# Patient Record
Sex: FEMALE | Race: WHITE | NOT HISPANIC OR LATINO | Employment: OTHER | URBAN - METROPOLITAN AREA
[De-identification: names, ages, dates, MRNs, and addresses within clinical notes are randomized per-mention and may not be internally consistent; named-entity substitution may affect disease eponyms.]

---

## 2021-02-01 DIAGNOSIS — Z23 ENCOUNTER FOR IMMUNIZATION: ICD-10-CM

## 2023-10-14 ENCOUNTER — APPOINTMENT (EMERGENCY)
Dept: RADIOLOGY | Facility: HOSPITAL | Age: 88
End: 2023-10-14
Payer: MEDICARE

## 2023-10-14 ENCOUNTER — HOSPITAL ENCOUNTER (EMERGENCY)
Facility: HOSPITAL | Age: 88
Discharge: HOME | End: 2023-10-14
Attending: EMERGENCY MEDICINE | Admitting: EMERGENCY MEDICINE
Payer: MEDICARE

## 2023-10-14 VITALS
HEART RATE: 101 BPM | SYSTOLIC BLOOD PRESSURE: 148 MMHG | HEIGHT: 59 IN | WEIGHT: 130 LBS | TEMPERATURE: 97 F | DIASTOLIC BLOOD PRESSURE: 89 MMHG | OXYGEN SATURATION: 95 % | BODY MASS INDEX: 26.21 KG/M2 | RESPIRATION RATE: 20 BRPM

## 2023-10-14 DIAGNOSIS — S22.20XA CLOSED FRACTURE OF STERNUM, UNSPECIFIED PORTION OF STERNUM, INITIAL ENCOUNTER: Primary | ICD-10-CM

## 2023-10-14 PROCEDURE — 63700000 HC SELF-ADMINISTRABLE DRUG: Performed by: PHYSICIAN ASSISTANT

## 2023-10-14 PROCEDURE — 99283 EMERGENCY DEPT VISIT LOW MDM: CPT | Mod: 25

## 2023-10-14 PROCEDURE — 71120 X-RAY EXAM BREASTBONE 2/>VWS: CPT

## 2023-10-14 PROCEDURE — 71046 X-RAY EXAM CHEST 2 VIEWS: CPT

## 2023-10-14 PROCEDURE — 93005 ELECTROCARDIOGRAM TRACING: CPT | Performed by: EMERGENCY MEDICINE

## 2023-10-14 RX ORDER — ACETAMINOPHEN 325 MG/1
650 TABLET ORAL ONCE
Status: COMPLETED | OUTPATIENT
Start: 2023-10-14 | End: 2023-10-14

## 2023-10-14 RX ADMIN — ACETAMINOPHEN 650 MG: 325 TABLET ORAL at 18:33

## 2023-10-14 ASSESSMENT — ENCOUNTER SYMPTOMS
PALPITATIONS: 0
DIZZINESS: 0
COUGH: 0
LIGHT-HEADEDNESS: 0
VOMITING: 0
WHEEZING: 0
CHILLS: 0
NAUSEA: 0
SHORTNESS OF BREATH: 0
BACK PAIN: 0
DIAPHORESIS: 0
FEVER: 0

## 2023-10-14 NOTE — ED ATTESTATION NOTE
I have personally seen and examined the patient.  I reviewed and agree with physician assistant / nurse practitioners assessment and plan of care, with the following exceptions: None    I personally performed the key components of the encounter and provided a substantive portion of the care and medical decision making    My examination, assessment, and plan of care of Nicole Goncalves is as follows:     PT was bending over 2 hours ago and felt a sudden onset of pain to the middle of her sternum. Hurts to bend over, twist, deep breaths.  No sob  Exam: awake, alert. NAD. Heart reg, lungs clear, abd soft, nt.  Chest wall: mod reproducible tenderness over the lower sternum.       Dane Payne MD  10/14/23 2450

## 2023-10-14 NOTE — DISCHARGE INSTRUCTIONS
Take Tylenol every 4-6 hours as needed for pain.    Please follow-up with a primary care provider (PCP) regularly. Please bring all your discharge paperwork with you to your follow up appointment. Your primary care physician will go over all of your results again including any incidental findings. If you do not have a PCP, please call [phone] ([phone]) for help finding one.    Return to ER for any new or worsening symptoms.

## 2023-10-14 NOTE — ED PROVIDER NOTES
Emergency Medicine Note  HPI   HISTORY OF PRESENT ILLNESS     HPI   89 y/o female with presents to the ED with complaint of sternum pain after bending down forward to  something 2 hours ago. She heard a cracking noise and believes it is MSK related.  Pain is significantly worse with movement when twisting or bending and significantly reproducible on palpation.  Denies any diaphoresis, nausea vomiting, exertional chest pain or radiation of the pain.  Denies any shortness of breath.  She has not taken anything for the pain at this time.  Patient was concerned due to prior history of rib fracture that she came to the ER for further evaluation.      Patient History   PAST HISTORY     Reviewed from Nursing Triage:       No past medical history on file.    No past surgical history on file.    No family history on file.           Review of Systems   REVIEW OF SYSTEMS     Review of Systems   Constitutional: Negative for chills, diaphoresis and fever.   Respiratory: Negative for cough, shortness of breath and wheezing.    Cardiovascular: Negative for palpitations and leg swelling.   Gastrointestinal: Negative for nausea and vomiting.   Musculoskeletal: Negative for back pain.        Pain overlying sternum   Neurological: Negative for dizziness, syncope and light-headedness.         VITALS     ED Vitals    Date/Time Temp Pulse Resp BP SpO2 [facility]   10/14/23 1756 36.1 °C (97 °F) 101 20 148/89 95 % EMU        Pulse Ox %: 95 % (10/14/23 1815)  Pulse Ox Interpretation: Normal (10/14/23 1815)           Physical Exam   PHYSICAL EXAM     Physical Exam  Vitals and nursing note reviewed.   Constitutional:       General: She is not in acute distress.     Appearance: She is not toxic-appearing or diaphoretic.   HENT:      Head: Normocephalic and atraumatic.   Cardiovascular:      Rate and Rhythm: Normal rate and regular rhythm.      Heart sounds: Normal heart sounds.   Pulmonary:      Effort: Pulmonary effort is normal. No  respiratory distress.      Breath sounds: Normal breath sounds.   Abdominal:      General: Abdomen is flat. There is no distension.      Palpations: Abdomen is soft.      Tenderness: There is no abdominal tenderness.   Musculoskeletal:         General: Normal range of motion.      Comments: Significant reproducible TTP overlying top of sternum   Skin:     General: Skin is warm and dry.   Neurological:      General: No focal deficit present.      Mental Status: She is alert.      Cranial Nerves: No cranial nerve deficit.           PROCEDURES     Procedures     DATA     Results     None          Imaging Results          X-RAY CHEST 2 VIEWS (In process)                X-RAY STERNUM PA AND LATERAL (Preliminary result)  Result time 10/14/23 19:14:24    Preliminary Interpretation    Sternal fracture                              ECG 12 lead    (Results Pending)       Scoring tools                                  ED Course & MDM   MDM / ED COURSE / CLINICAL IMPRESSION / DISPO     MDM    ED Course as of 10/14/23 1927   Sat Oct 14, 2023   1809 Pt presents with significant reproducible midsternal CP that is worse with movement  Pain started immediately after bending forward to pick something up  No diaphoresis, N/V, exertional CP or radiation  Plan for CXR with sternum views, EKG, Tylenol [TM]   1916 On x-ray questionable fracture of the sternum compared to prior imaging.  Will discharge home with follow-up PCP.  Educated on analgesics.  Patient is agreeable to above plan. [TM]      ED Course User Index  [TM] Gagandeep Aguero PA C     Clinical Impression      Closed fracture of sternum, unspecified portion of sternum, initial encounter     _________________     ED Disposition   Discharge                   Gagandeep Aguero PA C  10/14/23 1909       Gagandeep Aguero PA C  10/14/23 1918       Gagandeep Aguero PA C  10/14/23 1927

## 2023-10-15 LAB
ATRIAL RATE: 92
P AXIS: 27
PR INTERVAL: 144
QRS DURATION: 66
QT INTERVAL: 360
QTC CALCULATION(BAZETT): 445
R AXIS: -12
T WAVE AXIS: 11
VENTRICULAR RATE: 92

## 2023-10-15 PROCEDURE — 93010 ELECTROCARDIOGRAM REPORT: CPT | Performed by: INTERNAL MEDICINE

## 2023-10-16 ENCOUNTER — TRANSCRIBE ORDERS (OUTPATIENT)
Dept: RADIOLOGY | Age: 88
End: 2023-10-16

## 2023-10-16 ENCOUNTER — TRANSCRIBE ORDERS (OUTPATIENT)
Dept: SCHEDULING | Age: 88
End: 2023-10-16

## 2023-10-16 DIAGNOSIS — R93.89 ABNORMAL FINDINGS ON DIAGNOSTIC IMAGING OF OTHER SPECIFIED BODY STRUCTURES: Primary | ICD-10-CM

## 2023-10-19 ENCOUNTER — HOSPITAL ENCOUNTER (OUTPATIENT)
Dept: RADIOLOGY | Facility: HOSPITAL | Age: 88
Discharge: HOME | End: 2023-10-19
Attending: INTERNAL MEDICINE
Payer: MEDICARE

## 2023-10-19 DIAGNOSIS — R93.89 ABNORMAL FINDINGS ON DIAGNOSTIC IMAGING OF OTHER SPECIFIED BODY STRUCTURES: ICD-10-CM

## 2023-10-19 PROCEDURE — 71250 CT THORAX DX C-: CPT

## 2023-10-24 ENCOUNTER — HOSPITAL ENCOUNTER (OUTPATIENT)
Dept: RADIOLOGY | Facility: CLINIC | Age: 88
Discharge: HOME | End: 2023-10-24
Attending: INTERNAL MEDICINE
Payer: MEDICARE

## 2023-10-24 VITALS — WEIGHT: 130 LBS | BODY MASS INDEX: 27.29 KG/M2 | HEIGHT: 58 IN

## 2023-10-24 DIAGNOSIS — R91.8 OTHER NONSPECIFIC ABNORMAL FINDING OF LUNG FIELD: ICD-10-CM

## 2023-10-24 RX ORDER — FLUDEOXYGLUCOSE F18 300 MCI/ML
8.98 INJECTION INTRAVENOUS ONCE
Status: COMPLETED | OUTPATIENT
Start: 2023-10-24 | End: 2023-10-24

## 2023-10-24 RX ADMIN — FLUDEOXYGLUCOSE F18 8.98 MILLICURIE: 300 INJECTION INTRAVENOUS at 10:50

## 2023-10-25 ENCOUNTER — TRANSCRIBE ORDERS (OUTPATIENT)
Dept: REGISTRATION | Age: 88
End: 2023-10-25

## 2023-10-25 DIAGNOSIS — K62.9 DISEASE OF ANUS AND RECTUM, UNSPECIFIED: Primary | ICD-10-CM

## 2023-10-27 ENCOUNTER — PATIENT OUTREACH (OUTPATIENT)
Dept: RADIATION ONCOLOGY | Facility: HOSPITAL | Age: 88
End: 2023-10-27
Payer: MEDICARE

## 2023-10-30 ENCOUNTER — PATIENT OUTREACH (OUTPATIENT)
Dept: RADIATION ONCOLOGY | Facility: HOSPITAL | Age: 88
End: 2023-10-30
Payer: MEDICARE

## 2023-11-02 ENCOUNTER — HOSPITAL ENCOUNTER (OUTPATIENT)
Dept: RADIOLOGY | Facility: HOSPITAL | Age: 88
Discharge: HOME | End: 2023-11-02
Attending: INTERNAL MEDICINE
Payer: MEDICARE

## 2023-11-02 DIAGNOSIS — K62.9 DISEASE OF ANUS AND RECTUM, UNSPECIFIED: ICD-10-CM

## 2023-11-02 PROCEDURE — A9698 NON-RAD CONTRAST MATERIALNOC: HCPCS

## 2023-11-02 PROCEDURE — 63600105 HC IODINE BASED CONTRAST: Mod: JW

## 2023-11-02 PROCEDURE — 74177 CT ABD & PELVIS W/CONTRAST: CPT

## 2023-11-02 PROCEDURE — 25500000 HC DRUGS/INCIDENT RAD

## 2023-11-02 RX ORDER — IOPAMIDOL 755 MG/ML
75 INJECTION, SOLUTION INTRAVASCULAR
Status: COMPLETED | OUTPATIENT
Start: 2023-11-02 | End: 2023-11-02

## 2023-11-02 RX ADMIN — IOPAMIDOL 75 ML: 755 INJECTION, SOLUTION INTRAVENOUS at 12:45

## 2023-11-02 RX ADMIN — IOHEXOL 500 ML: 9 SOLUTION ORAL at 12:24

## 2023-11-03 ENCOUNTER — OFFICE VISIT (OUTPATIENT)
Dept: COLORECTAL SURGERY | Facility: CLINIC | Age: 88
End: 2023-11-03
Payer: MEDICARE

## 2023-11-03 VITALS — WEIGHT: 130 LBS | HEIGHT: 58 IN | BODY MASS INDEX: 27.29 KG/M2

## 2023-11-03 DIAGNOSIS — R93.3 ABNORMAL FINDING ON GI TRACT IMAGING: Primary | ICD-10-CM

## 2023-11-03 PROCEDURE — 99204 OFFICE O/P NEW MOD 45 MIN: CPT | Mod: 25 | Performed by: COLON & RECTAL SURGERY

## 2023-11-03 RX ORDER — ALPRAZOLAM 0.25 MG/1
0.25 TABLET ORAL DAILY PRN
COMMUNITY
Start: 2023-10-09

## 2023-11-03 RX ORDER — PHENAZOPYRIDINE HYDROCHLORIDE 200 MG/1
TABLET, FILM COATED ORAL
COMMUNITY

## 2023-11-03 RX ORDER — BENZONATATE 100 MG/1
100 CAPSULE ORAL 3 TIMES DAILY
COMMUNITY
Start: 2023-10-02

## 2023-11-03 RX ORDER — POTASSIUM CHLORIDE 750 MG/1
1 CAPSULE, EXTENDED RELEASE ORAL DAILY
COMMUNITY

## 2023-11-03 RX ORDER — ESTRADIOL 0.1 MG/G
CREAM VAGINAL
COMMUNITY

## 2023-11-03 RX ORDER — TIOTROPIUM BROMIDE INHALATION SPRAY 3.12 UG/1
1 SPRAY, METERED RESPIRATORY (INHALATION) DAILY PRN
COMMUNITY
Start: 2023-11-02

## 2023-11-03 RX ORDER — FUROSEMIDE 20 MG/1
TABLET ORAL
COMMUNITY
Start: 2023-10-09

## 2023-11-03 RX ORDER — OFLOXACIN 3 MG/ML
SOLUTION AURICULAR (OTIC)
COMMUNITY

## 2023-11-03 NOTE — PROGRESS NOTES
HISTORY & PHYSICAL EXAM      CC: Abnormal imaging of CT abdomen pelvis and PET/CT    HPI: Nicole is a very pleasant 88 year old referred by her PCP, Dr. Sullivan. 3 weeks ago  was bending over and twisting and ended up cracking her sternum, she went to the ED where she had a chest X-ray, showing a mass in the lung. She later had a follow-up PET, showing thickening at the rectum.     She has had multiple surgeries to her abdomen. She had an oophorectomy and then had perforated bowel and a colonic resection. She later had numerous SBO, and reports she may have had a resection surgery of the small intestine. She had a cholecystectomy in her 20's. She has a very large ventral hernia.     She moves her bowels 2-3 times per day. She denies rectal pain or rectal bleeding. She denies N/V, bloating, abdominal ain or recent unexpected weight loss.     Her last colonoscopy was man years ago she cannot remember the exact date. She is very scared to experience a perforation with her hernia.     She denies a family history of IBD or colorectal cancer.      Past medical history: COPD, large ventral incisional hernia with loss of abdominal domain    Past surgical history: Cholecystectomy, colonic resection, small bowel resection, history of small bowel obstructions status post expiratory laparotomy, oophorectomy    Current Outpatient Medications:     ALPRAZolam (XANAX) 0.25 mg tablet, TAKE ONE TABLET BY MOUTH DAILY AS NEEDED FOR ANXIETY, Disp: , Rfl:     benzonatate (TESSALON) 100 mg capsule, Take 100 mg by mouth 3 (three) times a day., Disp: , Rfl:     estradioL (ESTRACE) 0.01 % (0.1 mg/gram) vaginal cream, INSERT 1 GRAM NIGHTLY X 2 MONTHS, THEN EVERY OTHER NIGHT FOR 2 MONTHS, THEN TWICE WEEKLY, Disp: , Rfl:     furosemide (LASIX) 20 mg tablet, TAKE ONE TABLET BY MOUTH EVERY DAY AS NEEDED FOR ANKLE SWELLING, Disp: , Rfl:     ofloxacin (FLOXIN) 0.3 % otic solution, , Disp: , Rfl:     phenazopyridine (PYRIDIUM) 200 mg  tablet, TAKE ONE TABLET BY MOUTH UP TO THREE TIMES A DAY AS NEEDED, Disp: , Rfl:     potassium chloride (MICRO-K) 10 mEq CR capsule, Take 1 capsule by mouth daily., Disp: , Rfl:     tiotropium bromide (SPIRIVA RESPIMAT) 2.5 mcg/actuation mist inhaler, Spiriva Respimat 2.5 MCG/ACT Inhalation Aerosol Solution QTY: 4 gram Days: 30 Refills: 5  Written: 11/02/23 Patient Instructions: 2 puffs q d, Disp: , Rfl:     potassium-calcium-magnes-manga [phone] mg powder in packet, Potassium, Disp: , Rfl:    Allergies   Allergen Reactions    Naproxen Sodium Other (see comments) and GI intolerance    Dextromethorphan Hbr     Dextromethorphan Palpitations     Social History     Socioeconomic History    Marital status:      Spouse name: Not on file    Number of children: Not on file    Years of education: Not on file    Highest education level: Not on file   Occupational History    Not on file   Tobacco Use    Smoking status: Not on file    Smokeless tobacco: Not on file   Substance and Sexual Activity    Alcohol use: Not on file    Drug use: Not on file    Sexual activity: Not on file   Other Topics Concern    Not on file   Social History Narrative    Not on file     Social Determinants of Health     Financial Resource Strain: Not on file   Food Insecurity: No Food Insecurity (10/14/2023)    Hunger Vital Sign     Worried About Running Out of Food in the Last Year: Never true     Ran Out of Food in the Last Year: Never true   Transportation Needs: Not on file   Physical Activity: Not on file   Stress: Not on file   Social Connections: Not on file   Intimate Partner Violence: Not on file   Housing Stability: Not on file      No family history on file.    REVIEW OF SYSTEMS: A complete review of systems was performed.  Pertinent positives include: Constitutional as tired; HEENT-sinus problems, capped teeth, bridges; respiratory shortness of breath; GI-diarrhea; genitourinary menopause;  hematologic/lymphatic- swelling of arms or legs; musculoskeletal denies trauma, broken bones.  All other systems are reviewed and are negative except as noted in HPI.    PHYSICAL EXAM:  GEN: On examination the patient is resting comfortably.  NEURO/PSYCH: Alert and oriented ×3  HEENT: Eyes: Sclera anicteric  CHEST: Equal rise and fall the chest.  No tenderness bilaterally.  SKIN: Warm and moist  NODES: No groin or cervical lymphadenopathy  EXT: No palpable edema of the bilateral lower extremities.  No clubbing.  GI: Abdomen soft, nontender, nondistended.  No palpable liver or spleen edge.  The patient is a very large ventral incisional hernia with loss of abdominal domain that is nontender and soft.  There is no palpable mass, or t rebound enderness.  RECTAL: Perianal skin demonstrates no evidence of abnormality.  Digital exam reveals normal sphincter tone with no masses palpable.  Diagnostic flexible sigmoidoscopy: Carried out to level of the  descending colon was limited by scope length demonstrating no evidence of lesion seen in the colon except for 1 small subcentimeter polyp in the sigmoid colon.  There is certainly no abnormality in the rectum to explain the PET avid area on scan.    XRAYS: I personally reviewed the current X rays and the pertinent findings are: CT abdomen pelvis from 11/2/2023 demonstrates multiple pulmonary nodules consistent with metastatic disease, right hilar hypodensities, hypodensities in the liver and spleen, large ventral hernia.  PET/CT 10/24/2023: Demonstrates numerous  hypermetabolic mediastinal hilar lymph nodes as well as a small pulmonary nodule which would be consistent with carcinoma.  There was eccentric wall thickening of the rectum toward the left noted concerning for carcinoma.        ASSESSMENT/PLAN:     Abnormal finding on GI tract imaging  Patient had a PET/CT and CT scan that demonstrated potential thickening of the rectal wall concerning for rectal neoplasm primary  where she has lesions in her lung that are PET avid.  There is no evidence of lesion in her rectum on flexible sigmoidoscopy done today.  She did have a very small subcentimeter polyp in the sigmoid colon that I advised that she consider having removed endoscopically in the future however at this point I would recommend that she continue on with investigation to identify the issue causing a PET avid lesion in her lung.  She is going to see Dr. Crump for consultation in this regard.  I will see her back in the office on an as-needed basis and we will be happy to have her set up for colonoscopy if she wishes.

## 2023-11-03 NOTE — LETTER
November 3, 2023     Natalya Sullivan MD  [address]  [place] [address]  [facility] [address]    Patient: Nicole Goncalves  YOB: 1934  Date of Visit: 11/3/2023      Dear Dr. Sullivan:    Thank you for referring Nicole Goncalves to me for evaluation. Below are my notes for this consultation.    If you have questions, please do not hesitate to call me. I look forward to following your patient along with you.         Sincerely,        Bala Crouch MD        CC: Dalton Crump MD PhD    Willa, Bala MANDUJANO MD  11/3/2023 12:52 PM  Signed  HISTORY & PHYSICAL EXAM      CC: Abnormal imaging of CT abdomen pelvis and PET/CT    HPI: Nicole is a very pleasant 88 year old referred by her PCP, Dr. Sullivan. 3 weeks ago  was bending over and twisting and ended up cracking her sternum, she went to the ED where she had a chest X-ray, showing a mass in the lung. She later had a follow-up PET, showing thickening at the rectum.     She has had multiple surgeries to her abdomen. She had an oophorectomy and then had perforated bowel and a colonic resection. She later had numerous SBO, and reports she may have had a resection surgery of the small intestine. She had a cholecystectomy in her 20's. She has a very large ventral hernia.     She moves her bowels 2-3 times per day. She denies rectal pain or rectal bleeding. She denies N/V, bloating, abdominal ain or recent unexpected weight loss.     Her last colonoscopy was man years ago she cannot remember the exact date. She is very scared to experience a perforation with her hernia.     She denies a family history of IBD or colorectal cancer.      Past medical history: COPD, large ventral incisional hernia with loss of abdominal domain    Past surgical history: Cholecystectomy, colonic resection, small bowel resection, history of small bowel obstructions status post expiratory laparotomy, oophorectomy    Current Outpatient Medications:     ALPRAZolam (XANAX) 0.25 mg tablet,  TAKE ONE TABLET BY MOUTH DAILY AS NEEDED FOR ANXIETY, Disp: , Rfl:     benzonatate (TESSALON) 100 mg capsule, Take 100 mg by mouth 3 (three) times a day., Disp: , Rfl:     estradioL (ESTRACE) 0.01 % (0.1 mg/gram) vaginal cream, INSERT 1 GRAM NIGHTLY X 2 MONTHS, THEN EVERY OTHER NIGHT FOR 2 MONTHS, THEN TWICE WEEKLY, Disp: , Rfl:     furosemide (LASIX) 20 mg tablet, TAKE ONE TABLET BY MOUTH EVERY DAY AS NEEDED FOR ANKLE SWELLING, Disp: , Rfl:     ofloxacin (FLOXIN) 0.3 % otic solution, , Disp: , Rfl:     phenazopyridine (PYRIDIUM) 200 mg tablet, TAKE ONE TABLET BY MOUTH UP TO THREE TIMES A DAY AS NEEDED, Disp: , Rfl:     potassium chloride (MICRO-K) 10 mEq CR capsule, Take 1 capsule by mouth daily., Disp: , Rfl:     tiotropium bromide (SPIRIVA RESPIMAT) 2.5 mcg/actuation mist inhaler, Spiriva Respimat 2.5 MCG/ACT Inhalation Aerosol Solution QTY: 4 gram Days: 30 Refills: 5  Written: 11/02/23 Patient Instructions: 2 puffs q d, Disp: , Rfl:     potassium-calcium-magnes-manga [phone] mg powder in packet, Potassium, Disp: , Rfl:    Allergies   Allergen Reactions    Naproxen Sodium Other (see comments) and GI intolerance    Dextromethorphan Hbr     Dextromethorphan Palpitations     Social History     Socioeconomic History    Marital status:      Spouse name: Not on file    Number of children: Not on file    Years of education: Not on file    Highest education level: Not on file   Occupational History    Not on file   Tobacco Use    Smoking status: Not on file    Smokeless tobacco: Not on file   Substance and Sexual Activity    Alcohol use: Not on file    Drug use: Not on file    Sexual activity: Not on file   Other Topics Concern    Not on file   Social History Narrative    Not on file     Social Determinants of Health     Financial Resource Strain: Not on file   Food Insecurity: No Food Insecurity (10/14/2023)    Hunger Vital Sign     Worried About Running Out of Food in the Last Year:  Never true     Ran Out of Food in the Last Year: Never true   Transportation Needs: Not on file   Physical Activity: Not on file   Stress: Not on file   Social Connections: Not on file   Intimate Partner Violence: Not on file   Housing Stability: Not on file      No family history on file.    REVIEW OF SYSTEMS: A complete review of systems was performed.  Pertinent positives include: Constitutional as tired; HEENT-sinus problems, capped teeth, bridges; respiratory shortness of breath; GI-diarrhea; genitourinary menopause; hematologic/lymphatic- swelling of arms or legs; musculoskeletal denies trauma, broken bones.  All other systems are reviewed and are negative except as noted in HPI.    PHYSICAL EXAM:  GEN: On examination the patient is resting comfortably.  NEURO/PSYCH: Alert and oriented ×3  HEENT: Eyes: Sclera anicteric  CHEST: Equal rise and fall the chest.  No tenderness bilaterally.  SKIN: Warm and moist  NODES: No groin or cervical lymphadenopathy  EXT: No palpable edema of the bilateral lower extremities.  No clubbing.  GI: Abdomen soft, nontender, nondistended.  No palpable liver or spleen edge.  The patient is a very large ventral incisional hernia with loss of abdominal domain that is nontender and soft.  There is no palpable mass, or t rebound enderness.  RECTAL: Perianal skin demonstrates no evidence of abnormality.  Digital exam reveals normal sphincter tone with no masses palpable.  Diagnostic flexible sigmoidoscopy: Carried out to level of the  descending colon was limited by scope length demonstrating no evidence of lesion seen in the colon except for 1 small subcentimeter polyp in the sigmoid colon.  There is certainly no abnormality in the rectum to explain the PET avid area on scan.    XRAYS: I personally reviewed the current X rays and the pertinent findings are: CT abdomen pelvis from 11/2/2023 demonstrates multiple pulmonary nodules consistent with metastatic disease, right hilar  hypodensities, hypodensities in the liver and spleen, large ventral hernia.  PET/CT 10/24/2023: Demonstrates numerous  hypermetabolic mediastinal hilar lymph nodes as well as a small pulmonary nodule which would be consistent with carcinoma.  There was eccentric wall thickening of the rectum toward the left noted concerning for carcinoma.        ASSESSMENT/PLAN:     Abnormal finding on GI tract imaging  Patient had a PET/CT and CT scan that demonstrated potential thickening of the rectal wall concerning for rectal neoplasm primary where she has lesions in her lung that are PET avid.  There is no evidence of lesion in her rectum on flexible sigmoidoscopy done today.  She did have a very small subcentimeter polyp in the sigmoid colon that I advised that she consider having removed endoscopically in the future however at this point I would recommend that she continue on with investigation to identify the issue causing a PET avid lesion in her lung.  She is going to see Dr. Crump for consultation in this regard.  I will see her back in the office on an as-needed basis and we will be happy to have her set up for colonoscopy if she wishes.        Jovon Pérez PA C  11/3/2023 12:52 PM  Signed  Procedures

## 2023-11-03 NOTE — ASSESSMENT & PLAN NOTE
Patient had a PET/CT and CT scan that demonstrated potential thickening of the rectal wall concerning for rectal neoplasm primary where she has lesions in her lung that are PET avid.  There is no evidence of lesion in her rectum on flexible sigmoidoscopy done today.  She did have a very small subcentimeter polyp in the sigmoid colon that I advised that she consider having removed endoscopically in the future however at this point I would recommend that she continue on with investigation to identify the issue causing a PET avid lesion in her lung.  She is going to see Dr. Crump for consultation in this regard.  I will see her back in the office on an as-needed basis and we will be happy to have her set up for colonoscopy if she wishes.

## 2023-11-07 ENCOUNTER — PATIENT OUTREACH (OUTPATIENT)
Dept: RADIATION ONCOLOGY | Facility: HOSPITAL | Age: 88
End: 2023-11-07
Payer: MEDICARE

## 2023-11-07 ENCOUNTER — OFFICE VISIT (OUTPATIENT)
Dept: THORACIC SURGERY | Facility: CLINIC | Age: 88
End: 2023-11-07
Payer: MEDICARE

## 2023-11-07 ENCOUNTER — APPOINTMENT (OUTPATIENT)
Dept: LAB | Facility: HOSPITAL | Age: 88
End: 2023-11-07
Attending: PHYSICIAN ASSISTANT
Payer: MEDICARE

## 2023-11-07 VITALS
DIASTOLIC BLOOD PRESSURE: 80 MMHG | HEIGHT: 58 IN | WEIGHT: 133 LBS | BODY MASS INDEX: 27.92 KG/M2 | OXYGEN SATURATION: 98 % | HEART RATE: 93 BPM | SYSTOLIC BLOOD PRESSURE: 130 MMHG | RESPIRATION RATE: 20 BRPM | TEMPERATURE: 98.3 F

## 2023-11-07 DIAGNOSIS — Z01.818 PREOPERATIVE TESTING: ICD-10-CM

## 2023-11-07 DIAGNOSIS — R91.1 SOLITARY PULMONARY NODULE: Primary | ICD-10-CM

## 2023-11-07 PROBLEM — R59.0 MEDIASTINAL ADENOPATHY: Status: ACTIVE | Noted: 2023-11-06

## 2023-11-07 PROBLEM — R91.8 MULTIPLE LUNG NODULES ON CT: Status: ACTIVE | Noted: 2023-11-06

## 2023-11-07 LAB
ANION GAP SERPL CALC-SCNC: 5 MEQ/L (ref 3–15)
BUN SERPL-MCNC: 23 MG/DL (ref 7–25)
CALCIUM SERPL-MCNC: 9.6 MG/DL (ref 8.6–10.3)
CHLORIDE SERPL-SCNC: 110 MEQ/L (ref 98–107)
CO2 SERPL-SCNC: 26 MEQ/L (ref 21–31)
CREAT SERPL-MCNC: 0.6 MG/DL (ref 0.6–1.2)
ERYTHROCYTE [DISTWIDTH] IN BLOOD BY AUTOMATED COUNT: 13.5 % (ref 11.7–14.4)
GFR SERPL CREATININE-BSD FRML MDRD: >60 ML/MIN/1.73M*2
GLUCOSE SERPL-MCNC: 110 MG/DL (ref 70–99)
HCT VFR BLDCO AUTO: 41.9 % (ref 35–45)
HGB BLD-MCNC: 13.4 G/DL (ref 11.8–15.7)
MCH RBC QN AUTO: 32.7 PG (ref 28–33.2)
MCHC RBC AUTO-ENTMCNC: 32 G/DL (ref 32.2–35.5)
MCV RBC AUTO: 102.2 FL (ref 83–98)
PDW BLD AUTO: 12.6 FL (ref 9.4–12.3)
PLATELET # BLD AUTO: 194 K/UL (ref 150–369)
POTASSIUM SERPL-SCNC: 4 MEQ/L (ref 3.5–5.1)
RBC # BLD AUTO: 4.1 M/UL (ref 3.93–5.22)
SODIUM SERPL-SCNC: 141 MEQ/L (ref 136–145)
WBC # BLD AUTO: 7.09 K/UL (ref 3.8–10.5)

## 2023-11-07 PROCEDURE — 99204 OFFICE O/P NEW MOD 45 MIN: CPT | Performed by: THORACIC SURGERY (CARDIOTHORACIC VASCULAR SURGERY)

## 2023-11-07 PROCEDURE — 36415 COLL VENOUS BLD VENIPUNCTURE: CPT

## 2023-11-07 PROCEDURE — 85027 COMPLETE CBC AUTOMATED: CPT

## 2023-11-07 PROCEDURE — 82310 ASSAY OF CALCIUM: CPT

## 2023-11-07 NOTE — LETTER
Re: Nicole Goncalves  12/30/1934        Dear Natalya Sullivan MD    I appreciate the opportunity to evaluate  your patient Nicole Goncalves in the office today.    Nicole Goncalves is a very pleasant 89 y/o former smoker (60 pk year - 2 ppd x 30 years quit 35 years ago) who presents today to discuss bilateral lung nodules and mediastinal adenopathy.  She presented to the ED 10/14 with anterior chest pain after bending and feeling a crack.  A sternal fracture was likely and  there was haziness on xray that led to follow up CT imaging.  Bilateral lung nodularity, mediastinal adenopathy and emphysematous changes where noted. Follow up PET imaging showed the above findings to be significantly PET avid along with a focus in the rectum. She has a large ventral hernia with multiple abdominal continents so she is very weary of colonoscopy. On exam and with sigmoidoscopy, there was no concern for a mass; a small sigmoid polyp was removed.      Her review of systems is notable for anxiety as her primary complaint. She  does not have SOB at rest. JAY is noted if she has to walk incline but she does not do that often. She had not had  cough, hemoptysis, weight loss prior to her presentation to the ER. She now has decreased appetite which she attributes to anxiety.  She still has no cough or hemoptysis.  She has pressure in her hernia but no pain. She attributes increased ankle edema to her diet.  The remainder of her review of systems is negative        She has bilateral pulmonary nodularity and mediastinal adenopathy that are PET avid and accessible via navigational bronchoscopy and EBUS. This has been discussed with her, her son, PCP and oncology teams. We will plan to proceed with navigational bronchoscopy and EBUS to obtain tissue diagnosis. Detailed review of the procedure and risks were discussed. We addressed their great questions and she is  in favor of proceeding      Thank you for the opportunity to participate in her  care.    Dalton Crump MD PhD

## 2023-11-07 NOTE — PROGRESS NOTES
Thoracic Surgery Consultation      Patient ID: Nicole Goncalves                              : 1934  MRN: [id number]    Clinic:  [facility]                                            Visit Date: 2023  Encounter Provider: Dalton Crump  Referring Provider: Natalya Sullivan MD    Subjective       Nicole Goncalves is a very pleasant 89 y/o former smoker (60 pk year - 2 ppd x 30 years quit 35 years ago) who presents today to discuss bilateral lung nodules and mediastinal adenopathy.  She presented to the ED 10/14 with anterior chest pain after bending and feeling a crack.  A sternal fracture was likely and  there was haziness on xray that led to follow up CT imaging.  Bilateral lung nodularity, mediastinal adenopathy and emphysematous changes where noted. Follow up PET imaging showed the above findings to be significantly PET avid along with a focus in the rectum. She has a large ventral hernia with multiple abdominal continents so she is very weary of colonoscopy. On exam and with sigmoidoscopy, there was no concern for a mass; a small sigmoid polyp was removed.      Her review of systems is notable for anxiety as her primary complaint. She  does not have SOB at rest. JAY is noted if she has to walk incline but she does not do that often. She had not had  cough, hemoptysis, weight loss prior to her presentation to the ER. She now has decreased appetite which she attributes to anxiety.  She still has no cough or hemoptysis.  She has pressure in her hernia but no pain. She attributes increased ankle edema to her diet.  The remainder of her review of systems is negative           Past Medical History:  PVCs, CHF, MV insufficiency, OA, chronic back pain   Past Surgical History: tonsillectomy, hysterectomy, appendectomy    Social History:   Social History   She is a former smoker.  Her son is a dentist.  Tobacco Use    Smoking status: Former     Packs/day: 2.00     Years: 30.00     Additional pack years:  "0.00     Total pack years: 60.00     Types: Cigarettes    Smokeless tobacco: Never   Substance Use Topics    Alcohol use: Yes     Alcohol/week: 5.0 standard drinks of alcohol     Types: 5 Glasses of wine per week     Family History: No significant family history  Medications:   Current Outpatient Medications:     ALPRAZolam (XANAX) 0.25 mg tablet, TAKE ONE TABLET BY MOUTH DAILY AS NEEDED FOR ANXIETY, Disp: , Rfl:     potassium chloride (MICRO-K) 10 mEq CR capsule, Take 1 capsule by mouth daily., Disp: , Rfl:     benzonatate (TESSALON) 100 mg capsule, Take 100 mg by mouth 3 (three) times a day., Disp: , Rfl:     estradioL (ESTRACE) 0.01 % (0.1 mg/gram) vaginal cream, INSERT 1 GRAM NIGHTLY X 2 MONTHS, THEN EVERY OTHER NIGHT FOR 2 MONTHS, THEN TWICE WEEKLY, Disp: , Rfl:     furosemide (LASIX) 20 mg tablet, TAKE ONE TABLET BY MOUTH EVERY DAY AS NEEDED FOR ANKLE SWELLING, Disp: , Rfl:     ofloxacin (FLOXIN) 0.3 % otic solution, , Disp: , Rfl:     phenazopyridine (PYRIDIUM) 200 mg tablet, TAKE ONE TABLET BY MOUTH UP TO THREE TIMES A DAY AS NEEDED, Disp: , Rfl:     potassium-calcium-magnes-manga [phone] mg powder in packet, Potassium, Disp: , Rfl:     tiotropium bromide (SPIRIVA RESPIMAT) 2.5 mcg/actuation mist inhaler, Spiriva Respimat 2.5 MCG/ACT Inhalation Aerosol Solution QTY: 4 gram Days: 30 Refills: 5  Written: 11/02/23 Patient Instructions: 2 puffs q d, Disp: , Rfl:   Allergies:   Allergies   Allergen Reactions    Naproxen Sodium Other (see comments) and GI intolerance    Dextromethorphan Hbr     Dextromethorphan Palpitations         Objective   Physical Exam:  Visit Vitals  /80 (BP Location: Right upper arm, Patient Position: Sitting)   Pulse 93   Temp 36.8 °C (98.3 °F) (Oral)   Resp 20   Ht 1.473 m (4' 10\")   Wt 60.3 kg (133 lb)   SpO2 98%   BMI 27.80 kg/m²       Constitutional: No acute distress. Appears younger than her stated age.   Neurologic: Alert and Oriented with no  deficit or " asymmetry.  Cooperative affect  Head: Good dentition  Eyes: Antiicteric,  Neck:  Trachea is midline. No cervical or supraclavicular adenopathy.   Respiratory: Clear to auscultation bilaterally without wheeze, rhonchi, or crackle noted.   Cardiovascular: Regular rate and rhythm.    Abdomen: Soft, Non-tender.  She has a large defect in the  Lower abdomen/pelvis with bowel sounds present. No tenderness   Musculoskeletal: significant for kyphosis.  She ambulates short distance with steady gait and uses walker for moderate distance.  Extremities: No cyanosis or clubbing. She has bilateral pitting edema in her ankles, no discoloration  Skin: Intact and without lesion, wound or rash of concern.     Performance Status:   ECO    We have personally reviewed her images in the office with her and her son.    Assessment   She has bilateral pulmonary nodularity and mediastinal adenopathy that are PET avid and accessible via navigational bronchoscopy and EBUS. This has been discussed with her, her son, PCP and oncology teams. We will plan to proceed with navigational bronchoscopy and EBUS to obtain tissue diagnosis. Detailed review of the procedure and risks were discussed. We addressed their great questions and she is  in favor of proceeding

## 2023-11-07 NOTE — H&P (VIEW-ONLY)
Thoracic Surgery Consultation      Patient ID: Nicole Goncalves                              : 1934  MRN: [id number]    Clinic:  [facility]                                            Visit Date: 2023  Encounter Provider: Dalton Crump  Referring Provider: Natalya Sullivan MD    Subjective       Nicole Goncalves is a very pleasant 89 y/o former smoker (60 pk year - 2 ppd x 30 years quit 35 years ago) who presents today to discuss bilateral lung nodules and mediastinal adenopathy.  She presented to the ED 10/14 with anterior chest pain after bending and feeling a crack.  A sternal fracture was likely and  there was haziness on xray that led to follow up CT imaging.  Bilateral lung nodularity, mediastinal adenopathy and emphysematous changes where noted. Follow up PET imaging showed the above findings to be significantly PET avid along with a focus in the rectum. She has a large ventral hernia with multiple abdominal continents so she is very weary of colonoscopy. On exam and with sigmoidoscopy, there was no concern for a mass; a small sigmoid polyp was removed.      Her review of systems is notable for anxiety as her primary complaint. She  does not have SOB at rest. JAY is noted if she has to walk incline but she does not do that often. She had not had  cough, hemoptysis, weight loss prior to her presentation to the ER. She now has decreased appetite which she attributes to anxiety.  She still has no cough or hemoptysis.  She has pressure in her hernia but no pain. She attributes increased ankle edema to her diet.  The remainder of her review of systems is negative           Past Medical History:  PVCs, CHF, MV insufficiency, OA, chronic back pain   Past Surgical History: tonsillectomy, hysterectomy, appendectomy    Social History:   Social History   She is a former smoker.  Her son is a dentist.  Tobacco Use    Smoking status: Former     Packs/day: 2.00     Years: 30.00     Additional pack years:  "0.00     Total pack years: 60.00     Types: Cigarettes    Smokeless tobacco: Never   Substance Use Topics    Alcohol use: Yes     Alcohol/week: 5.0 standard drinks of alcohol     Types: 5 Glasses of wine per week     Family History: No significant family history  Medications:   Current Outpatient Medications:     ALPRAZolam (XANAX) 0.25 mg tablet, TAKE ONE TABLET BY MOUTH DAILY AS NEEDED FOR ANXIETY, Disp: , Rfl:     potassium chloride (MICRO-K) 10 mEq CR capsule, Take 1 capsule by mouth daily., Disp: , Rfl:     benzonatate (TESSALON) 100 mg capsule, Take 100 mg by mouth 3 (three) times a day., Disp: , Rfl:     estradioL (ESTRACE) 0.01 % (0.1 mg/gram) vaginal cream, INSERT 1 GRAM NIGHTLY X 2 MONTHS, THEN EVERY OTHER NIGHT FOR 2 MONTHS, THEN TWICE WEEKLY, Disp: , Rfl:     furosemide (LASIX) 20 mg tablet, TAKE ONE TABLET BY MOUTH EVERY DAY AS NEEDED FOR ANKLE SWELLING, Disp: , Rfl:     ofloxacin (FLOXIN) 0.3 % otic solution, , Disp: , Rfl:     phenazopyridine (PYRIDIUM) 200 mg tablet, TAKE ONE TABLET BY MOUTH UP TO THREE TIMES A DAY AS NEEDED, Disp: , Rfl:     potassium-calcium-magnes-manga [phone] mg powder in packet, Potassium, Disp: , Rfl:     tiotropium bromide (SPIRIVA RESPIMAT) 2.5 mcg/actuation mist inhaler, Spiriva Respimat 2.5 MCG/ACT Inhalation Aerosol Solution QTY: 4 gram Days: 30 Refills: 5  Written: 11/02/23 Patient Instructions: 2 puffs q d, Disp: , Rfl:   Allergies:   Allergies   Allergen Reactions    Naproxen Sodium Other (see comments) and GI intolerance    Dextromethorphan Hbr     Dextromethorphan Palpitations         Objective   Physical Exam:  Visit Vitals  /80 (BP Location: Right upper arm, Patient Position: Sitting)   Pulse 93   Temp 36.8 °C (98.3 °F) (Oral)   Resp 20   Ht 1.473 m (4' 10\")   Wt 60.3 kg (133 lb)   SpO2 98%   BMI 27.80 kg/m²       Constitutional: No acute distress. Appears younger than her stated age.   Neurologic: Alert and Oriented with no  deficit or " asymmetry.  Cooperative affect  Head: Good dentition  Eyes: Antiicteric,  Neck:  Trachea is midline. No cervical or supraclavicular adenopathy.   Respiratory: Clear to auscultation bilaterally without wheeze, rhonchi, or crackle noted.   Cardiovascular: Regular rate and rhythm.    Abdomen: Soft, Non-tender.  She has a large defect in the  Lower abdomen/pelvis with bowel sounds present. No tenderness   Musculoskeletal: significant for kyphosis.  She ambulates short distance with steady gait and uses walker for moderate distance.  Extremities: No cyanosis or clubbing. She has bilateral pitting edema in her ankles, no discoloration  Skin: Intact and without lesion, wound or rash of concern.     Performance Status:   ECO    We have personally reviewed her images in the office with her and her son.    Assessment   She has bilateral pulmonary nodularity and mediastinal adenopathy that are PET avid and accessible via navigational bronchoscopy and EBUS. This has been discussed with her, her son, PCP and oncology teams. We will plan to proceed with navigational bronchoscopy and EBUS to obtain tissue diagnosis. Detailed review of the procedure and risks were discussed. We addressed their great questions and she is  in favor of proceeding

## 2023-11-10 ENCOUNTER — PATIENT OUTREACH (OUTPATIENT)
Dept: RADIATION ONCOLOGY | Facility: HOSPITAL | Age: 88
End: 2023-11-10
Payer: MEDICARE

## 2023-11-14 ENCOUNTER — APPOINTMENT (OUTPATIENT)
Dept: PREADMISSION TESTING | Facility: HOSPITAL | Age: 88
End: 2023-11-14
Payer: MEDICARE

## 2023-11-14 ENCOUNTER — ANESTHESIA EVENT (OUTPATIENT)
Dept: SURGERY | Facility: HOSPITAL | Age: 88
Setting detail: HOSPITAL OUTPATIENT SURGERY
End: 2023-11-14
Payer: MEDICARE

## 2023-11-14 VITALS — BODY MASS INDEX: 27.29 KG/M2 | HEIGHT: 58 IN | WEIGHT: 130 LBS

## 2023-11-14 RX ORDER — PNV NO.95/FERROUS FUM/FOLIC AC 28MG-0.8MG
100 TABLET ORAL DAILY
COMMUNITY

## 2023-11-14 RX ORDER — CYANOCOBALAMIN (VITAMIN B-12) 500 MCG
TABLET ORAL DAILY
COMMUNITY

## 2023-11-14 ASSESSMENT — PAIN SCALES - GENERAL: PAINLEVEL: 0-NO PAIN

## 2023-11-14 NOTE — PRE-PROCEDURE INSTRUCTIONS
1. We will call you between 3 pm and 7 pm on nOV 13. 2023 to determine that arrival time for your procedure. If you do not hear by 6PM. Please call [phone] for arrival time.     2. Please report to Main Entrance near Parking lot A, walk into main lobby and report to the admission desk on the first floor on the day of your procedure.    3. Please follow the following fasting guidelines:     No solid food EIGHT HOURS prior to surgery.  Unlimited clear liquids, meaning water or PLAIN black coffee WITHOUT any milk, cream, sugar, or sweetener are permitted up to TWO HOURS prior to arrival at the hospital.    4. Please take ONLY the following medications with a sip of water on the morning of your procedure:   BRING SPIRIVA INHALER  NO ASPIRIN, NSAIDS, VITAMINS OR HERBAL SUPPLEMENTS   TYLENOL IS OK TO US  XANAX IF NEED     5. Other Instructions: You may brush your teeth the morning of the procedure. Rinse and spit, do not swallow.  Bring a list of your medications with dosages.  Use surgical wash as       directed.     6. If you develop a cold, cough, fever, rash, or any other symptom prior to the day of the procedure, please report it to your physician immediately.    7. If you need to cancel the procedure for any reason, please contact your physician.    8. Make arrangements to have safe transportation home accompanied by a responsible adult. If you have not arranged safe transportation home, your surgery will be cancelled. Safe transportation may include private vehicle, ride-share service, taxi and public transportation when accompanied by a responsible adult who will assist you home. A responsible adult is someone known to you and does not include the taxi, ride-share or public transit drive transporting you.    9. You may not take public transportation unless accompanied by a responsible person.    10. You may not drive a car or operate complex or potentially dangerous machinery for 24 hours following anesthesia  and/or sedation.    11.  If it is medically necessary for you to have a longer stay, you will be informed as soon as the decision is made.    12. Only bring essential items to the hospital.  Do not wear or bring anything of value to the hospital including jewelry of any kind, money, or wallet. Do not wear make-up or contact lenses.  DO NOT BRING MEDICATIONS FROM HOME unless instructed to do so. DO bring your hearing aids, glasses, and a case    13. No lotion, creams, powders, or oils on skin once you start the surgical wash or Dial soap.        14. Dress in comfortable clothes.    15.  If instructed, please bring a copy of your Advanced Directive (Living Will/Durable Power of ) on the day of your procedure.     16. Ensuring your safety at all times is a very important part of our Health system Culture of Safety. After having surgery and sedation, you are at risk for falling and balance issues. Although you may feel awake, the effects of the medication can last up to 24 hours after anesthesia. If you need to use the bathroom during your recovery period, nursing staff will escort you there and stay with you to ensure your safety.    17. Refrain from drinking alcohol and smoking cigarettes/ marijuana for 24 hours prior to surgery.    18. Shower with antibacterial soap (Dial) the night before and morning of your procedure.  If your procedure indicates the need for CHG antiseptic wash (Bactoshield or Hibiclens), please use this instead and follow instructions as discussed at the time of your Pre-Admission Testing phone interview or visit.    Above instructions reviewed with patient and patient acknowledges understanding.  Form explained by: Luiza Paz RN     I have read and understand the above information. I have had sufficient opportunity to ask questions I might have and they have been answered to my satisfaction. I agree to comply with the Patient Responsibilities listed above and have received a copy of this  form.        [facility]   Patient Education Preoperative Showers       Good Hygiene, such as frequent handwashing and daily skin cleansing, promotes good health.  Daily skin cleansing helps remove germs that may cause infections. The following instructions should be followed to help reduce germs on your skin prior to your surgical procedure.     · Bactoshield/Hibiclens CHG 4% is an antiseptic soap.  The active ingredient is chlorhexidine gluconate. Do not use this product if you are allergic to chlorhexidine gluconate.     · The NIGHT before and the MORNING of your procedure , shower or bathe with Bactoshield. This product should replace your regular soap used for cleansing most of your body surfaces. Bactoshield should not be used on your head or face: keep out of your eyes, ears, and mouth.      · If you plan to wash your hair, do so with your regular shampoo. Then, rinse the hair and your body thoroughly to move any shampoo residue.     · Wash face with regular soap and water only.     · Wash your genital area with soap and water only.    · Thoroughly rinse your body with warm water from the neck down.       · Apply the minimum amount of Bactoshield to cover the skin. Use this product as you would any liquid soap. Leave this on for 2 minutes. Note- Bactoshield DOES NOT LATHER like normal soap.      · Wash the skin gently and rinse thoroughly with warm water. You do not need to scrub the skin to remove germs.      · Do not use regular soap after you have applied and rinsed the Bactoshield.  Change into clean clothes after each shower.     · Do not apply any lotion, powder, or perfumes to the body areas that have been cleansed with Bactoshield.     · No use of hair removal products or shaving at or near the surgical site 48 hours before your procedure. (72 hours for cardiac patients.)    · For those having perineal area surgeries (ie: vaginal, rectal or cystoscopy) - please use Dial soap.        Why do we  cleanse the skin prior to surgery?   There are lots of germs on our skin and in our environment. Most are harmless, some are even helpful on our skin and in our environment. As part of your preparation you will be asked to purchase a bottle of antibacterial soap, Bactoshield CHG 4% or Hibiclens CHG4% to cleanse your skin and eliminate a certain bacteria called, Staphylococcus Aureus.      What is Staphylococcus aureus? (Staph)   Staph is a common germ found on the skin. One-third of healthy people carry this germ. Methicillin-resistant Staphylococcus aureus (MRSA) is a type of Staph bacteria that is resistant to certain antibiotics. In the community, most MRSA infections are skin infections.  People who have Staph/MRSA may show no signs of illness- this is called colonization. Caution needs to be used when you come into the hospital for a surgical procedure to ensure that Staph does not enter your body.     Am I at increased risk for infection if I am carrying Staph ?   Because Staph is carried on your skin, you may be at increased risk for developing a surgical infection. To reduce the presence of Staph on your skin, we recommend a series of preoperative showers with an antibacterial skin cleanser. These showers are to be done the NIGHT BEFORE your surgery and the MORNING of your surgery.

## 2023-11-15 ENCOUNTER — APPOINTMENT (OUTPATIENT)
Dept: RADIOLOGY | Facility: HOSPITAL | Age: 88
Setting detail: HOSPITAL OUTPATIENT SURGERY
End: 2023-11-15
Attending: THORACIC SURGERY (CARDIOTHORACIC VASCULAR SURGERY)
Payer: MEDICARE

## 2023-11-15 ENCOUNTER — HOSPITAL ENCOUNTER (OUTPATIENT)
Facility: HOSPITAL | Age: 88
Setting detail: HOSPITAL OUTPATIENT SURGERY
Discharge: HOME | End: 2023-11-15
Attending: THORACIC SURGERY (CARDIOTHORACIC VASCULAR SURGERY) | Admitting: THORACIC SURGERY (CARDIOTHORACIC VASCULAR SURGERY)
Payer: MEDICARE

## 2023-11-15 ENCOUNTER — ANESTHESIA (OUTPATIENT)
Dept: SURGERY | Facility: HOSPITAL | Age: 88
Setting detail: HOSPITAL OUTPATIENT SURGERY
End: 2023-11-15
Payer: MEDICARE

## 2023-11-15 ENCOUNTER — APPOINTMENT (OUTPATIENT)
Dept: RADIOLOGY | Facility: HOSPITAL | Age: 88
Setting detail: HOSPITAL OUTPATIENT SURGERY
End: 2023-11-15
Attending: PHYSICIAN ASSISTANT
Payer: MEDICARE

## 2023-11-15 VITALS
BODY MASS INDEX: 26.61 KG/M2 | HEART RATE: 90 BPM | RESPIRATION RATE: 20 BRPM | WEIGHT: 132 LBS | DIASTOLIC BLOOD PRESSURE: 82 MMHG | TEMPERATURE: 97.2 F | OXYGEN SATURATION: 94 % | SYSTOLIC BLOOD PRESSURE: 132 MMHG | HEIGHT: 59 IN

## 2023-11-15 DIAGNOSIS — R91.8 MULTIPLE LUNG NODULES ON CT: ICD-10-CM

## 2023-11-15 DIAGNOSIS — R59.0 MEDIASTINAL ADENOPATHY: ICD-10-CM

## 2023-11-15 LAB
GRAM STN SPEC: NORMAL

## 2023-11-15 PROCEDURE — 25800000 HC PHARMACY IV SOLUTIONS: Performed by: PHYSICIAN ASSISTANT

## 2023-11-15 PROCEDURE — 25000000 HC PHARMACY GENERAL: Performed by: NURSE ANESTHETIST, CERTIFIED REGISTERED

## 2023-11-15 PROCEDURE — 88305 TISSUE EXAM BY PATHOLOGIST: CPT | Mod: 59 | Performed by: THORACIC SURGERY (CARDIOTHORACIC VASCULAR SURGERY)

## 2023-11-15 PROCEDURE — 71000001 HC PACU PHASE 1 INITIAL 30MIN: Performed by: THORACIC SURGERY (CARDIOTHORACIC VASCULAR SURGERY)

## 2023-11-15 PROCEDURE — 87205 SMEAR GRAM STAIN: CPT | Performed by: THORACIC SURGERY (CARDIOTHORACIC VASCULAR SURGERY)

## 2023-11-15 PROCEDURE — 31641 BRONCHOSCOPY TREAT BLOCKAGE: CPT

## 2023-11-15 PROCEDURE — 87070 CULTURE OTHR SPECIMN AEROBIC: CPT | Performed by: THORACIC SURGERY (CARDIOTHORACIC VASCULAR SURGERY)

## 2023-11-15 PROCEDURE — 0BBJ8ZX EXCISION OF LEFT LOWER LUNG LOBE, VIA NATURAL OR ARTIFICIAL OPENING ENDOSCOPIC, DIAGNOSTIC: ICD-10-PCS | Performed by: HOSPITALIST

## 2023-11-15 PROCEDURE — 71000002 HC PACU PHASE 2 INITIAL 30MIN: Performed by: THORACIC SURGERY (CARDIOTHORACIC VASCULAR SURGERY)

## 2023-11-15 PROCEDURE — 31645 BRNCHSC W/THER ASPIR 1ST: CPT

## 2023-11-15 PROCEDURE — 31653 BRONCH EBUS SAMPLNG 3/> NODE: CPT

## 2023-11-15 PROCEDURE — 27200000 HC STERILE SUPPLY: Performed by: THORACIC SURGERY (CARDIOTHORACIC VASCULAR SURGERY)

## 2023-11-15 PROCEDURE — 87116 MYCOBACTERIA CULTURE: CPT | Mod: 59 | Performed by: THORACIC SURGERY (CARDIOTHORACIC VASCULAR SURGERY)

## 2023-11-15 PROCEDURE — 87206 SMEAR FLUORESCENT/ACID STAI: CPT | Mod: 59 | Performed by: THORACIC SURGERY (CARDIOTHORACIC VASCULAR SURGERY)

## 2023-11-15 PROCEDURE — 36000012 HC OR LEVEL 2 EA ADDL MIN: Performed by: THORACIC SURGERY (CARDIOTHORACIC VASCULAR SURGERY)

## 2023-11-15 PROCEDURE — 87015 SPECIMEN INFECT AGNT CONCNTJ: CPT | Mod: 59 | Performed by: THORACIC SURGERY (CARDIOTHORACIC VASCULAR SURGERY)

## 2023-11-15 PROCEDURE — 0BBC8ZX EXCISION OF RIGHT UPPER LUNG LOBE, VIA NATURAL OR ARTIFICIAL OPENING ENDOSCOPIC, DIAGNOSTIC: ICD-10-PCS | Performed by: HOSPITALIST

## 2023-11-15 PROCEDURE — 71000012 HC PACU PHASE 2 EA ADDL MIN: Performed by: THORACIC SURGERY (CARDIOTHORACIC VASCULAR SURGERY)

## 2023-11-15 PROCEDURE — 71000011 HC PACU PHASE 1 EA ADDL MIN: Performed by: THORACIC SURGERY (CARDIOTHORACIC VASCULAR SURGERY)

## 2023-11-15 PROCEDURE — 25800000 HC PHARMACY IV SOLUTIONS: Performed by: NURSE ANESTHETIST, CERTIFIED REGISTERED

## 2023-11-15 PROCEDURE — 0BD88ZX EXTRACTION OF LEFT UPPER LOBE BRONCHUS, VIA NATURAL OR ARTIFICIAL OPENING ENDOSCOPIC, DIAGNOSTIC: ICD-10-PCS | Performed by: HOSPITALIST

## 2023-11-15 PROCEDURE — 36000002 HC OR LEVEL 2 INITIAL 30MIN: Performed by: THORACIC SURGERY (CARDIOTHORACIC VASCULAR SURGERY)

## 2023-11-15 PROCEDURE — 0BD68ZX EXTRACTION OF RIGHT LOWER LOBE BRONCHUS, VIA NATURAL OR ARTIFICIAL OPENING ENDOSCOPIC, DIAGNOSTIC: ICD-10-PCS | Performed by: HOSPITALIST

## 2023-11-15 PROCEDURE — 63700000 HC SELF-ADMINISTRABLE DRUG: Performed by: PHYSICIAN ASSISTANT

## 2023-11-15 PROCEDURE — 71045 X-RAY EXAM CHEST 1 VIEW: CPT

## 2023-11-15 PROCEDURE — 87102 FUNGUS ISOLATION CULTURE: CPT | Performed by: THORACIC SURGERY (CARDIOTHORACIC VASCULAR SURGERY)

## 2023-11-15 PROCEDURE — 88331 PATH CONSLTJ SURG 1 BLK 1SPC: CPT | Mod: 59 | Performed by: PATHOLOGY

## 2023-11-15 PROCEDURE — 63600000 HC DRUGS/DETAIL CODE: Performed by: NURSE ANESTHETIST, CERTIFIED REGISTERED

## 2023-11-15 PROCEDURE — 27200000 HC STERILE SUPPLY

## 2023-11-15 PROCEDURE — 87206 SMEAR FLUORESCENT/ACID STAI: CPT | Performed by: THORACIC SURGERY (CARDIOTHORACIC VASCULAR SURGERY)

## 2023-11-15 PROCEDURE — 37000001 HC ANESTHESIA GENERAL: Performed by: THORACIC SURGERY (CARDIOTHORACIC VASCULAR SURGERY)

## 2023-11-15 RX ORDER — ONDANSETRON HYDROCHLORIDE 2 MG/ML
4 INJECTION, SOLUTION INTRAVENOUS
Status: DISCONTINUED | OUTPATIENT
Start: 2023-11-15 | End: 2023-11-15 | Stop reason: HOSPADM

## 2023-11-15 RX ORDER — DEXTROSE 40 %
15-30 GEL (GRAM) ORAL AS NEEDED
Status: DISCONTINUED | OUTPATIENT
Start: 2023-11-15 | End: 2023-11-15 | Stop reason: HOSPADM

## 2023-11-15 RX ORDER — FENTANYL CITRATE 50 UG/ML
INJECTION, SOLUTION INTRAMUSCULAR; INTRAVENOUS AS NEEDED
Status: DISCONTINUED | OUTPATIENT
Start: 2023-11-15 | End: 2023-11-15 | Stop reason: SURG

## 2023-11-15 RX ORDER — SODIUM CHLORIDE 9 MG/ML
INJECTION, SOLUTION INTRAVENOUS CONTINUOUS
Status: DISCONTINUED | OUTPATIENT
Start: 2023-11-15 | End: 2023-11-15 | Stop reason: HOSPADM

## 2023-11-15 RX ORDER — DEXTROSE 50 % IN WATER (D50W) INTRAVENOUS SYRINGE
25 AS NEEDED
Status: DISCONTINUED | OUTPATIENT
Start: 2023-11-15 | End: 2023-11-15 | Stop reason: HOSPADM

## 2023-11-15 RX ORDER — ROCURONIUM BROMIDE 10 MG/ML
INJECTION, SOLUTION INTRAVENOUS AS NEEDED
Status: DISCONTINUED | OUTPATIENT
Start: 2023-11-15 | End: 2023-11-15 | Stop reason: SURG

## 2023-11-15 RX ORDER — ALBUTEROL SULFATE 90 UG/1
2 INHALANT RESPIRATORY (INHALATION)
Status: COMPLETED | OUTPATIENT
Start: 2023-11-15 | End: 2023-11-15

## 2023-11-15 RX ORDER — DEXMEDETOMIDINE HYDROCHLORIDE 100 UG/ML
INJECTION, SOLUTION INTRAVENOUS AS NEEDED
Status: DISCONTINUED | OUTPATIENT
Start: 2023-11-15 | End: 2023-11-15 | Stop reason: SURG

## 2023-11-15 RX ORDER — LIDOCAINE HYDROCHLORIDE 10 MG/ML
INJECTION, SOLUTION INFILTRATION; PERINEURAL AS NEEDED
Status: DISCONTINUED | OUTPATIENT
Start: 2023-11-15 | End: 2023-11-15 | Stop reason: SURG

## 2023-11-15 RX ORDER — PROPOFOL 10 MG/ML
INJECTION, EMULSION INTRAVENOUS AS NEEDED
Status: DISCONTINUED | OUTPATIENT
Start: 2023-11-15 | End: 2023-11-15 | Stop reason: SURG

## 2023-11-15 RX ORDER — ONDANSETRON HYDROCHLORIDE 2 MG/ML
INJECTION, SOLUTION INTRAVENOUS AS NEEDED
Status: DISCONTINUED | OUTPATIENT
Start: 2023-11-15 | End: 2023-11-15 | Stop reason: SURG

## 2023-11-15 RX ORDER — FENTANYL CITRATE 50 UG/ML
50 INJECTION, SOLUTION INTRAMUSCULAR; INTRAVENOUS EVERY 5 MIN PRN
Status: DISCONTINUED | OUTPATIENT
Start: 2023-11-15 | End: 2023-11-15 | Stop reason: HOSPADM

## 2023-11-15 RX ORDER — IBUPROFEN 200 MG
16-32 TABLET ORAL AS NEEDED
Status: DISCONTINUED | OUTPATIENT
Start: 2023-11-15 | End: 2023-11-15 | Stop reason: HOSPADM

## 2023-11-15 RX ORDER — DEXAMETHASONE SODIUM PHOSPHATE 4 MG/ML
INJECTION, SOLUTION INTRA-ARTICULAR; INTRALESIONAL; INTRAMUSCULAR; INTRAVENOUS; SOFT TISSUE AS NEEDED
Status: DISCONTINUED | OUTPATIENT
Start: 2023-11-15 | End: 2023-11-15 | Stop reason: SURG

## 2023-11-15 RX ADMIN — ALBUTEROL SULFATE 2 PUFF: 90 AEROSOL, METERED RESPIRATORY (INHALATION) at 10:22

## 2023-11-15 RX ADMIN — ONDANSETRON HYDROCHLORIDE 4 MG: 2 SOLUTION INTRAMUSCULAR; INTRAVENOUS at 14:40

## 2023-11-15 RX ADMIN — ROCURONIUM BROMIDE 20 MG: 10 INJECTION, SOLUTION INTRAVENOUS at 11:54

## 2023-11-15 RX ADMIN — ROCURONIUM BROMIDE 50 MG: 10 INJECTION, SOLUTION INTRAVENOUS at 11:09

## 2023-11-15 RX ADMIN — ROCURONIUM BROMIDE 20 MG: 10 INJECTION, SOLUTION INTRAVENOUS at 12:37

## 2023-11-15 RX ADMIN — ROCURONIUM BROMIDE 10 MG: 10 INJECTION, SOLUTION INTRAVENOUS at 13:22

## 2023-11-15 RX ADMIN — DEXAMETHASONE SODIUM PHOSPHATE 10 MG: 4 INJECTION, SOLUTION INTRAMUSCULAR; INTRAVENOUS at 11:20

## 2023-11-15 RX ADMIN — ROCURONIUM BROMIDE 10 MG: 10 INJECTION, SOLUTION INTRAVENOUS at 14:11

## 2023-11-15 RX ADMIN — PROPOFOL 20 MG: 10 INJECTION, EMULSION INTRAVENOUS at 14:44

## 2023-11-15 RX ADMIN — ROCURONIUM BROMIDE 10 MG: 10 INJECTION, SOLUTION INTRAVENOUS at 13:43

## 2023-11-15 RX ADMIN — ROCURONIUM BROMIDE 5 MG: 10 INJECTION, SOLUTION INTRAVENOUS at 14:44

## 2023-11-15 RX ADMIN — DEXMEDETOMIDINE HYDROCHLORIDE 8 MCG: 100 INJECTION, SOLUTION INTRAVENOUS at 11:01

## 2023-11-15 RX ADMIN — FENTANYL CITRATE 50 MCG: 50 INJECTION, SOLUTION INTRAMUSCULAR; INTRAVENOUS at 11:03

## 2023-11-15 RX ADMIN — PROPOFOL 30 MG: 10 INJECTION, EMULSION INTRAVENOUS at 15:03

## 2023-11-15 RX ADMIN — PROPOFOL 120 MCG/KG/MIN: 10 INJECTION, EMULSION INTRAVENOUS at 11:13

## 2023-11-15 RX ADMIN — SODIUM CHLORIDE: 9 INJECTION, SOLUTION INTRAVENOUS at 14:02

## 2023-11-15 RX ADMIN — SODIUM CHLORIDE: 9 INJECTION, SOLUTION INTRAVENOUS at 09:42

## 2023-11-15 RX ADMIN — PROPOFOL 20 MG: 10 INJECTION, EMULSION INTRAVENOUS at 14:50

## 2023-11-15 RX ADMIN — PROPOFOL 150 MG: 10 INJECTION, EMULSION INTRAVENOUS at 11:08

## 2023-11-15 RX ADMIN — DEXMEDETOMIDINE HYDROCHLORIDE 0.2 MCG/KG/HR: 100 INJECTION, SOLUTION INTRAVENOUS at 11:13

## 2023-11-15 RX ADMIN — PROPOFOL 25 MG: 10 INJECTION, EMULSION INTRAVENOUS at 15:18

## 2023-11-15 RX ADMIN — SUGAMMADEX 200 MG: 100 INJECTION, SOLUTION INTRAVENOUS at 15:23

## 2023-11-15 RX ADMIN — LIDOCAINE HYDROCHLORIDE 3 ML: 10 INJECTION, SOLUTION INFILTRATION; PERINEURAL at 11:08

## 2023-11-15 RX ADMIN — FENTANYL CITRATE 50 MCG: 50 INJECTION, SOLUTION INTRAMUSCULAR; INTRAVENOUS at 11:11

## 2023-11-15 ASSESSMENT — COPD QUESTIONNAIRES: COPD: 1

## 2023-11-15 NOTE — INTERVAL H&P NOTE
H&P reviewed. The patient was examined and there are no changes to the H&P.  She is anxious to get answers today and she and her son are still in agreement with procedure as scheduled/planned.

## 2023-11-15 NOTE — BRIEF OP NOTE
ION NAVIGATIONAL BRONCHOSCOPY FOR LUNG BIOPSY,  ENDOBRONCHIAL ULTRASOUND FOR LYMPH NODE BIOPSY Procedure Note    Procedure:    ION NAVIGATIONAL BRONCHOSCOPY FOR LUNG BIOPSY,  ENDOBRONCHIAL ULTRASOUND FOR LYMPH NODE BIOPSY  CPT(R) Code:  25355 - WI BRONCHOSCOPY W/CPTR-ASST IMAGE-GUIDED NAVIGATION      Pre-op Diagnosis     * Multiple lung nodules on CT [R91.8]     * Mediastinal adenopathy [R59.0]       Post-op Diagnosis     * Multiple lung nodules on CT [R91.8]     * Mediastinal adenopathy [R59.0]    Surgeon(s) and Role:     * Dalton Crump MD PhD - Primary     * Lizeth Zuñiga PA C - Assisting     * Alonso Crespo T,     Anesthesia: Monitor Anesthesia Care    Staff:   Circulator: Radha Hernandez RN; Marcia Ramos RN    Procedure Details   ELVIS nodules; NSCLC  RLL nodules granulomatous disease    Estimated Blood Loss: None    Specimens:                Order Name Source Comment Collection Info Order Time   FUNGAL CULTURE / SMEAR Bronchioalveolar Lavage, Left Upper Lobe Pre-op diagnosis:  Multiple lung nodules on CT [R91.8]  Mediastinal adenopathy [R59.0] Collected By: Dalton Crump MD PhD 11/15/2023 12:15 PM     Release to patient   Immediate        SPUTUM CULTURE / SMEAR Bronchioalveolar Lavage, Left Upper Lobe Pre-op diagnosis:  Multiple lung nodules on CT [R91.8]  Mediastinal adenopathy [R59.0] Collected By: Dalton Crump MD PhD 11/15/2023 12:15 PM     Release to patient   Immediate        AFB CULTURE / SMEAR Bronchioalveolar Lavage, Left Upper Lobe Pre-op diagnosis:  Multiple lung nodules on CT [R91.8]  Mediastinal adenopathy [R59.0] Collected By: Dalton Crump MD PhD 11/15/2023 12:15 PM     Release to patient   Immediate        ANAEROBIC CULTURE / SMEAR (INCLUDES AEROBIC CULTURE) Lung, Left Upper Lobe Pre-op diagnosis:  Multiple lung nodules on CT [R91.8]  Mediastinal adenopathy [R59.0] Collected By: Dalton Crump MD PhD 11/15/2023 12:17 PM     Release to patient   Immediate        FUNGAL  CULTURE / SMEAR Lung, Left Upper Lobe Pre-op diagnosis:  Multiple lung nodules on CT [R91.8]  Mediastinal adenopathy [R59.0] Collected By: Dalton Crump MD PhD 11/15/2023 12:17 PM     Release to patient   Immediate        TISSUE CULTURE / SMEAR Lung, Left Upper Lobe Pre-op diagnosis:  Multiple lung nodules on CT [R91.8]  Mediastinal adenopathy [R59.0] Collected By: Datlon Crump MD PhD 11/15/2023 12:17 PM     Release to patient   Immediate        AFB CULTURE / SMEAR Lung, Left Upper Lobe Pre-op diagnosis:  Multiple lung nodules on CT [R91.8]  Mediastinal adenopathy [R59.0] Collected By: Dalton Crump MD PhD 11/15/2023 12:17 PM     Release to patient   Immediate        FUNGAL CULTURE / SMEAR Bronchioalveolar Lavage, Left Upper Lobe Pre-op diagnosis:  Multiple lung nodules on CT [R91.8]  Mediastinal adenopathy [R59.0] Collected By: Dalton Crump MD PhD 11/15/2023 12:29 PM     Release to patient   Immediate        SPUTUM CULTURE / SMEAR Bronchioalveolar Lavage, Left Upper Lobe Pre-op diagnosis:  Multiple lung nodules on CT [R91.8]  Mediastinal adenopathy [R59.0] Collected By: Dalton Crump MD PhD 11/15/2023 12:29 PM     Release to patient   Immediate        AFB CULTURE / SMEAR Bronchioalveolar Lavage, Left Upper Lobe Pre-op diagnosis:  Multiple lung nodules on CT [R91.8]  Mediastinal adenopathy [R59.0] Collected By: Dalton Crump MD PhD 11/15/2023 12:29 PM     Release to patient   Immediate        FUNGAL CULTURE / SMEAR Bronchioalveolar Lavage, Right Lower Lobe Pre-op diagnosis:  Multiple lung nodules on CT [R91.8]  Mediastinal adenopathy [R59.0] Collected By: Dalton Crump MD PhD 11/15/2023  2:01 PM     Release to patient   Immediate        SPUTUM CULTURE / SMEAR Bronchioalveolar Lavage, Right Lower Lobe Pre-op diagnosis:  Multiple lung nodules on CT [R91.8]  Mediastinal adenopathy [R59.0] Collected By: Dalton Crump MD PhD 11/15/2023  2:01 PM     Release to patient   Immediate        AFB CULTURE / SMEAR  Bronchioalveolar Lavage, Right Lower Lobe Pre-op diagnosis:  Multiple lung nodules on CT [R91.8]  Mediastinal adenopathy [R59.0] Collected By: Dalton Crump MD PhD 11/15/2023  2:01 PM     Release to patient   Immediate        FUNGAL CULTURE / SMEAR Bronchioalveolar Lavage, Right Lower Lobe Pre-op diagnosis:  Multiple lung nodules on CT [R91.8]  Mediastinal adenopathy [R59.0] Collected By: Dalton Crump MD PhD 11/15/2023  2:43 PM     Release to patient   Immediate        SPUTUM CULTURE / SMEAR Bronchioalveolar Lavage, Right Lower Lobe Pre-op diagnosis:  Multiple lung nodules on CT [R91.8]  Mediastinal adenopathy [R59.0] Collected By: Dalton Crump MD PhD 11/15/2023  2:43 PM     Release to patient   Immediate        AFB CULTURE / SMEAR Bronchioalveolar Lavage, Right Lower Lobe Pre-op diagnosis:  Multiple lung nodules on CT [R91.8]  Mediastinal adenopathy [R59.0] Collected By: Dalton Crump MD PhD 11/15/2023  2:43 PM     Release to patient   Immediate        CYTOLOGY, NON-GYN (MLHL) Lung, Left Upper Lobe Pre-op diagnosis:  Multiple lung nodules on CT [R91.8]  Mediastinal adenopathy [R59.0] Collected By: Dalton Crump MD PhD 11/15/2023 12:01 PM     Release to patient   Immediate        PATHOLOGY TISSUE EXAM Lung, Left Upper Lobe Pre-op diagnosis:  Multiple lung nodules on CT [R91.8]  Mediastinal adenopathy [R59.0] Collected By: Dalton Crump MD PhD 11/15/2023 12:07 PM     Release to patient   Immediate                       Complications:  None; patient tolerated the procedure well.           Disposition: PACU - hemodynamically stable.           Condition: stable    Dalton Crump MD PhD  Phone Number: [phone]

## 2023-11-15 NOTE — OR SURGEON
Pre-Procedure patient identification:   I am the primary operating surgeon/proceduralist and I have reviewed the applicable pathology reports and radiology studies for this procedure. I have identified the patient on 11/15/23 at 10:55 AM Dalton Crump MD PhD  Phone Number: [phone]

## 2023-11-15 NOTE — ANESTHESIA PROCEDURE NOTES
Airway  Urgency: elective    Start Time: 11/15/2023 11:12 AM  Airway not difficult    General Information and Staff    Patient location during procedure: OR  Resident/CRNA: Yung Burns CRNA  Performed: resident/CRNA   Performed by: Yung Burns CRNA  Authorized by: Greg Wolf MD      Indications and Patient Condition  Indications for airway management: anesthesia  Sedation level: general  Preoxygenated: yes  Patient position: sniffing  Mask difficulty assessment: 1 - vent by mask    Final Airway Details  Final airway type: endotracheal airway      Successful airway: ETT  Cuffed: yes   Successful intubation technique: video laryngoscopy  Facilitating devices/methods: intubating stylet  Endotracheal tube insertion site: oral  Blade: Sosa  Blade size: #3  ETT size (mm): 8.5  Cormack-Lehane Classification: grade I - full view of glottis  Placement verified by: bronchoscopy and capnometry   Measured from: teeth  ETT to teeth (cm): 21  Number of attempts at approach: 1  Number of other approaches attempted: 0  Atraumatic airway insertion

## 2023-11-15 NOTE — DISCHARGE INSTRUCTIONS
DISCHARGE INSTRUCTIONS  THORACIC SURGERY    Procedure: Navigational ION bronchoscopy with lung biopsies    - It is very common to cough and cough up a small amount of blood for a day or two after your procedure.  This should not cause shortness of breath or be in large amounts.  If this occurs, please call 911      DRIVING:  Please avoid driving today.  You have received anesthesia which can alter your acuity making driving or operating heavy machinery very dangerous      ACTIVITY:  Carefully return to your normal household routines today  You should be able to resume all normal activity approximately 24 hours after having anesthesia      MEDICATION:  Please resume all your regular medications      DIET:  You may resume your regular diet as you tolerate  Some patients notice an upset stomach or decreased appetite the day of their procedure/anesthesia which should resolve.        WHEN TO CALL US: [phone]   Fever: Temperature of 101 degrees or above.  (Temperatures of 100.4 are normal after surgery)  New body aches or discomfort  Nausea and/or Vomiting   Hoarseness that does not improve over the next couple days    WHEN TO CALL 911  Chest pain, tightness or heaviness  Difficulty breathing  Dizziness or loss of consciousness  Abnormal weakness (especially if on one side of the body) or changes in speech  Abnormal swelling and/or pain in your arm or leg      **Someone is always available to answer your questions.  Please call if you are concerned.  Our office number is [phone]      FOLLOW Up:   We will plan to call you with your results and further recommendations.    Lizeth Zuñiga PA-C

## 2023-11-15 NOTE — ANESTHESIA PREPROCEDURE EVALUATION
Relevant Problems   No relevant active problems       Anesthesia ROS/MED HX    Anesthesia History - neg  Pulmonary    COPD  Neuro/Psych - neg  Cardiovascular- neg  Hematological - neg  GI/Hepatic- neg  Musculoskeletal- neg  Renal Disease- neg  Endo/Other- neg       Past Surgical History:   Procedure Laterality Date    CHOLECYSTECTOMY      COLECTOMY      COLONOSCOPY      HYSTERECTOMY      LAPAROTOMY EXPLORATORY      LUMBAR EPIDURAL INJECTION      TONSILLECTOMY         Physical Exam    Airway   Mallampati: II   TM distance: >3 FB   Neck ROM: full  Cardiovascular - normal   Rhythm: regular   Rate: normalPulmonary - normal   clear to auscultation  Dental - normal        Anesthesia Plan    Plan: general    Technique: general endotracheal and total IV anesthesia     Lines and Monitors: PIV     Airway: oral intubation   3 ASA  Anesthetic plan and risks discussed with: patient  Postop Plan:   Patient Disposition: phase II then home   Pain Management: IV analgesics

## 2023-11-15 NOTE — OP NOTE
EBUS Bronchoscopy Report          Date: 11/15/2023    Pre-Procedure Diagnosis: Hypermetabolic bilateral pulmonary nodules with ELVIS nodule showing NSCLC on onsite cytology, hypermetabolic mediastinal lymphadenopathy    Post-Procedure Diagnosis: same    Procedure Start Time: 14:45    Procedure End Time:  15:20    Sedation utilized throughout the procedure: General anesthesia was provided by our anesthesiology colleagues.  Please see their flowsheet for details.    Summary of Procedure:  After completing the robotic navigational bronchoscopy aspect of the procedure, the robotic bronchoscope was removed and the the EBUS bronchoscope was passed through endotracheal tube down to the saniya.    A full bilateral hilar and mediastinal lymph node assessment was performed with findings as follows:  -Right hilum/11R, 12 mm, biopsied via TBNA  -4R, 15 mm, biopsied via TBNA  -7, 25 mm, biopsied via TBNA with sample also placed in RPMI for flow cytometry  -Left hilum/11L, 15 mm, biopsied via TBNA      There was no active bleeding at procedures end. Both basilar segments were lightly suctioned for any secretions that dripped down during the procedure. The bronchoscope was then removed. The patient tolerated the procedure well.    Estimated Blood Loss: <5cc    Complications: None        Impression:    1.  Left upper lobe NSCLC diagnosed on robotic navigational bronchoscopy (see operative note by Dr. Crump)    2. Mediastinal adenopathy:     Lymph node stations assessed:  -Right hilum/11R, 12 mm, biopsied via TBNA  -4R, 15 mm, biopsied via TBNA  -7, 25 mm, biopsied via TBNA with sample also placed in RPMI for flow cytometry  -Left hilum/11L, 15 mm, biopsied via TBNA    3.  Normal airway exam without active bleeding, secretions, endobronchial tumor.      Alonso Crespo DO  Pulmonary & Critical Care Medicine  11/15/2023  3:32 PM

## 2023-11-15 NOTE — ANESTHESIOLOGIST PRE-PROCEDURE ATTESTATION
Pre-Procedure Patient Identification:  I am the Primary Anesthesiologist and have identified the patient on 11/15/23 at 10:02 AM.   I have confirmed the procedure(s) will be performed by the following surgeon/proceduralist Dalton Crump MD PhD.

## 2023-11-15 NOTE — ANESTHESIA POSTPROCEDURE EVALUATION
Patient: Nicole Goncalves    Procedure Summary     Date: 11/15/23 Room / Location: [facility]    Anesthesia Start: 1100 Anesthesia Stop: 1550    Procedure: ION NAVIGATIONAL BRONCHOSCOPY FOR LUNG BIOPSY,  ENDOBRONCHIAL ULTRASOUND FOR LYMPH NODE BIOPSY Diagnosis:       Multiple lung nodules on CT      Mediastinal adenopathy      (Multiple lung nodules on CT [R91.8])      (Mediastinal adenopathy [R59.0])    Surgeons: Dalton Crump MD PhD Responsible Provider: Greg Wolf MD    Anesthesia Type: general ASA Status: 3          Anesthesia Type: general  PACU Vitals  11/15/2023 1549 - 11/15/2023 1649      11/15/2023  1549 11/15/2023  1600 11/15/2023  1615 11/15/2023  1630    BP: 148/75 149/78 142/71 141/77    Pulse: 82 85 83 83    Resp: 14 23 17 17    SpO2: 100 % 92 % 97 % 96 %              11/15/2023  1645             BP: 141/78       Pulse: 83       Resp: 13       SpO2: 97 %               Anesthesia Post Evaluation    Pain management: adequate  Patient participation: complete - patient participated  Level of consciousness: awake and alert  Cardiovascular status: acceptable  Airway Patency: adequate  Respiratory status: acceptable  Hydration status: acceptable  Anesthetic complications: no

## 2023-11-16 LAB
FUNGUS STAIN: NORMAL
RHODAMINE-AURAMINE STN SPEC: NORMAL

## 2023-11-17 LAB
MICROORGANISM SPEC CULT: NORMAL

## 2023-11-20 LAB
CASE RPRT: NORMAL
CASE RPRT: NORMAL
CLIN PATH EVAL NOTE: NORMAL
CLINICAL INFO: NORMAL
CLINICAL INFO: NORMAL
GRAM STN SPEC: NORMAL
GRAM STN SPEC: NORMAL
IMMUNE STAIN STUDY: NORMAL
IMMUNE STAIN STUDY: NORMAL
MICROORGANISM SPEC CULT: NORMAL
PATH REPORT.ADDENDUM SPEC: NORMAL
PATH REPORT.FINAL DX SPEC: NORMAL
PATH REPORT.GROSS SPEC: NORMAL
PATH REPORT.GROSS SPEC: NORMAL
PATH REPORT.INTRAOP OBS SPEC DOC: NORMAL

## 2023-11-22 ENCOUNTER — LAB REQUISITION (OUTPATIENT)
Dept: LAB | Facility: HOSPITAL | Age: 88
End: 2023-11-22
Attending: INTERNAL MEDICINE
Payer: MEDICARE

## 2023-11-22 LAB
BASOPHILS # BLD: 0.07 K/UL (ref 0.01–0.1)
BASOPHILS NFR BLD: 0.9 %
DIFFERENTIAL METHOD BLD: ABNORMAL
EOSINOPHIL # BLD: 0.06 K/UL (ref 0.04–0.36)
EOSINOPHIL NFR BLD: 0.8 %
ERYTHROCYTE [DISTWIDTH] IN BLOOD BY AUTOMATED COUNT: 13.1 % (ref 11.7–14.4)
HCT VFR BLDCO AUTO: 42.5 % (ref 35–45)
HGB BLD-MCNC: 14.2 G/DL (ref 11.8–15.7)
IMM GRANULOCYTES # BLD AUTO: 0.02 K/UL (ref 0–0.08)
IMM GRANULOCYTES NFR BLD AUTO: 0.3 %
LYMPHOCYTES # BLD: 0.77 K/UL (ref 1.2–3.5)
LYMPHOCYTES NFR BLD: 9.9 %
MCH RBC QN AUTO: 33.1 PG (ref 28–33.2)
MCHC RBC AUTO-ENTMCNC: 33.4 G/DL (ref 32.2–35.5)
MCV RBC AUTO: 99.1 FL (ref 83–98)
MONOCYTES # BLD: 0.75 K/UL (ref 0.28–0.8)
MONOCYTES NFR BLD: 9.7 %
NEUTROPHILS # BLD: 6.07 K/UL (ref 1.7–7)
NEUTROPHILS # BLD: 6.07 K/UL (ref 1.7–7)
NEUTS SEG NFR BLD: 78.4 %
NRBC BLD-RTO: 0 %
PDW BLD AUTO: 12.4 FL (ref 9.4–12.3)
PLATELET # BLD AUTO: 206 K/UL (ref 150–369)
RBC # BLD AUTO: 4.29 M/UL (ref 3.93–5.22)
WBC # BLD AUTO: 7.74 K/UL (ref 3.8–10.5)

## 2023-11-22 PROCEDURE — 85025 COMPLETE CBC W/AUTO DIFF WBC: CPT | Performed by: INTERNAL MEDICINE

## 2023-11-27 ENCOUNTER — TELEMEDICINE (OUTPATIENT)
Dept: THORACIC SURGERY | Facility: CLINIC | Age: 88
End: 2023-11-27
Payer: MEDICARE

## 2023-11-27 DIAGNOSIS — C34.82 MALIGNANT NEOPLASM OF OVERLAPPING SITES OF LEFT LUNG (CMS/HCC): Primary | ICD-10-CM

## 2023-11-27 PROCEDURE — 200200 PR NO CHARGE: Mod: 95 | Performed by: PHYSICIAN ASSISTANT

## 2023-11-28 ENCOUNTER — PATIENT OUTREACH (OUTPATIENT)
Dept: RADIATION ONCOLOGY | Facility: HOSPITAL | Age: 88
End: 2023-11-28
Payer: MEDICARE

## 2023-11-28 ENCOUNTER — TRANSCRIBE ORDERS (OUTPATIENT)
Dept: SCHEDULING | Age: 88
End: 2023-11-28

## 2023-11-28 DIAGNOSIS — C34.12 MALIGNANT NEOPLASM OF UPPER LOBE, LEFT BRONCHUS OR LUNG (CMS/HCC): Primary | ICD-10-CM

## 2023-11-28 NOTE — PROGRESS NOTES
This is not a true telemedicine encounter.  It was a discussion with pt's daughter regarding results from ION biopsy.  The pt has met with Dr. Browning and has a plan to move forward with treatment.  We are in agreement.  She was appreciative of our review

## 2023-12-04 ENCOUNTER — OFFICE VISIT (OUTPATIENT)
Dept: THORACIC SURGERY | Facility: CLINIC | Age: 88
End: 2023-12-04
Payer: MEDICARE

## 2023-12-04 ENCOUNTER — TRANSCRIBE ORDERS (OUTPATIENT)
Dept: REGISTRATION | Facility: HOSPITAL | Age: 88
End: 2023-12-04

## 2023-12-04 ENCOUNTER — HOSPITAL ENCOUNTER (OUTPATIENT)
Dept: RADIOLOGY | Facility: HOSPITAL | Age: 88
Setting detail: NUCLEAR MEDICINE
Discharge: HOME | End: 2023-12-04
Attending: INTERNAL MEDICINE
Payer: MEDICARE

## 2023-12-04 VITALS
HEART RATE: 98 BPM | BODY MASS INDEX: 28.17 KG/M2 | TEMPERATURE: 97 F | DIASTOLIC BLOOD PRESSURE: 103 MMHG | OXYGEN SATURATION: 96 % | WEIGHT: 134.2 LBS | HEIGHT: 58 IN | SYSTOLIC BLOOD PRESSURE: 172 MMHG

## 2023-12-04 DIAGNOSIS — C34.12 MALIGNANT NEOPLASM OF UPPER LOBE, LEFT BRONCHUS OR LUNG (CMS/HCC): ICD-10-CM

## 2023-12-04 DIAGNOSIS — C34.90 MALIGNANT NEOPLASM OF UNSPECIFIED PART OF UNSPECIFIED BRONCHUS OR LUNG (CMS/HCC): ICD-10-CM

## 2023-12-04 DIAGNOSIS — C34.92 NON-SMALL CELL CANCER OF LEFT LUNG (CMS/HCC): Primary | ICD-10-CM

## 2023-12-04 DIAGNOSIS — C34.90 MALIGNANT NEOPLASM OF UNSPECIFIED PART OF UNSPECIFIED BRONCHUS OR LUNG (CMS/HCC): Primary | ICD-10-CM

## 2023-12-04 PROCEDURE — A9503 TC99M MEDRONATE: HCPCS | Performed by: INTERNAL MEDICINE

## 2023-12-04 PROCEDURE — 99212 OFFICE O/P EST SF 10 MIN: CPT | Performed by: THORACIC SURGERY (CARDIOTHORACIC VASCULAR SURGERY)

## 2023-12-04 PROCEDURE — 78306 BONE IMAGING WHOLE BODY: CPT

## 2023-12-04 RX ADMIN — TECHNETIUM TC 99M MEDRONATE 26.9 MILLICURIE: 25 INJECTION, POWDER, FOR SOLUTION INTRAVENOUS at 07:45

## 2023-12-04 NOTE — LETTER
2023     Natalya Sullivan MD  [address]  [place] [address]  [facility] [address]    Patient: Nicole Goncalves  YOB: 1934  Date of Visit: 2023      Dear Dr. Sullivan:    Thank you for referring Nicole Goncalves to me for evaluation. Below are my notes for this consultation.    If you have questions, please do not hesitate to call me. I look forward to following your patient along with you.         Sincerely,        Dalton Crump MD PhD        CC: MD Yogesh Longoria Patsy L, PA C  2023 10:34 AM  Sign when Signing Visit  Thoracic Surgery    Patient ID: Nicole Goncalves                              : 1934  MRN: [id number]  Clinic: [facility]                                            Visit Date: 2023  Encounter Provider: Dalton Crump  Referring Provider: Natalya Sullivan MD      Patient: Nicole Goncalves  Chief Complaint: Follow-up after ION/EBUS 11/15/2023      Subjective     Nicole Goncalves is a very delightful 88 y.o. old female who underwent Ion navigational bronchoscopy and endobronchial ultrasound on 11/15/2023.  This was due to PET avid bilateral pulmonary nodules and mediastinal adenopathy.  Final pathology confirms adenocarcinoma and a left upper lobe nodule as well as station 7 and 4R lymph nodes.  She has strong intensity PD-L1 expression.  She is getting a bone scan today, starting Keytruda this Wednesday, and scheduled for brain MRI on .  She is very optimistic and motivated to start treatment.  She is also seeking holistic/alternative medicine consultation    Her ROS is notable for doing quite well with overall with no major complaints.  She has no shortness of breath, cough or hemoptysis.  She is here today with an aide and is able to walk within the office independently.  She has a rolling walker that she uses for long distances.        Past Medical History:  has a past medical history of Anxiety, Arthritis, Bowel obstruction (CMS/HCC), Closed  fracture of body of sternum, COPD (chronic obstructive pulmonary disease) (CMS/[facility]), COVID-19 vaccine series completed, Depression, Lung nodules, and Osteoporosis.  Past Surgical History:  has a past surgical history that includes Tonsillectomy; Colectomy; Hysterectomy; Colonoscopy; Lumbar epidural injection; Cholecystectomy; and laparotomy exploratory.  Social History:   Social History     Tobacco Use   • Smoking status: Former     Packs/day: 2.00     Years: 30.00     Additional pack years: 0.00     Total pack years: 60.00     Types: Cigarettes     Quit date:      Years since quittin.9   • Smokeless tobacco: Never   Substance Use Topics   • Alcohol use: Yes     Alcohol/week: 5.0 standard drinks of alcohol     Types: 5 Glasses of wine per week     Comment: wine /day - 10 ounces   • Drug use: Never     Medications:   Current Outpatient Medications   Medication Sig Dispense Refill   • ALPRAZolam (XANAX) 0.25 mg tablet Take 0.25 mg by mouth daily as needed.     • cholecalciferol, vitamin D3, (cholecalciferol) 400 unit (10 mcg) tablet tablet Take by mouth daily.     • cyanocobalamin (VITAMIN B12) 100 mcg tablet Take 100 mcg by mouth daily.     • folic acid/multivit-min/lutein (CENTRUM SILVER ORAL) Take by mouth.     • potassium chloride (MICRO-K) 10 mEq CR capsule Take 1 capsule by mouth daily.     • benzonatate (TESSALON) 100 mg capsule Take 100 mg by mouth 3 (three) times a day. Not taking     • estradioL (ESTRACE) 0.01 % (0.1 mg/gram) vaginal cream Not taking     • furosemide (LASIX) 20 mg tablet TAKE ONE TABLET BY MOUTH EVERY DAY AS NEEDED FOR ANKLE SWELLING     • ofloxacin (FLOXIN) 0.3 % otic solution      • phenazopyridine (PYRIDIUM) 200 mg tablet NOT TAKING     • tiotropium bromide (SPIRIVA RESPIMAT) 2.5 mcg/actuation mist inhaler 1 puff daily as needed.       No current facility-administered medications for this visit.       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  She ambulates with slight limp but steady gait.   Pulmonary: Good air entry bilaterally and clear   CV: Regular rate and rhythm   Extremities: Stable bilateral lower extremity edema      Imaging Review: No new imaging for review      Assessment   We are very pleased with her attitude and motivation. She will continue to discuss integrating alternative therapy into her immunotherapy regime with Dr. Browning.  She is extremely appreciative of our care and will keep us informed of how she is doing. We will be happy to remain involved on an as needed basis.          "

## 2023-12-04 NOTE — PROGRESS NOTES
Thoracic Surgery    Patient ID: Nicole Goncalves                              : 1934  MRN: [id number]  Clinic: [facility]                                            Visit Date: 2023  Encounter Provider: Dalton Crump  Referring Provider: Ntaalya Sullivan MD      Patient: Nicole Goncalves  Chief Complaint: Follow-up after ION/EBUS 11/15/2023      Subjective     Nicole Goncalves is a very delightful 88 y.o. old female who underwent Ion navigational bronchoscopy and endobronchial ultrasound on 11/15/2023.  This was due to PET avid bilateral pulmonary nodules and mediastinal adenopathy.  Final pathology confirms adenocarcinoma and a left upper lobe nodule as well as station 7 and 4R lymph nodes.  She has strong intensity PD-L1 expression.  She is getting a bone scan today, starting Keytruda this Wednesday, and scheduled for brain MRI on .  She is very optimistic and motivated to start treatment.  She is also seeking holistic/alternative medicine consultation    Her ROS is notable for doing quite well with overall with no major complaints.  She has no shortness of breath, cough or hemoptysis.  She is here today with an aide and is able to walk within the office independently.  She has a rolling walker that she uses for long distances.         Past Medical History:  has a past medical history of Anxiety, Arthritis, Bowel obstruction (CMS/HCC), Closed fracture of body of sternum, COPD (chronic obstructive pulmonary disease) (CMS/HCC), COVID-19 vaccine series completed, Depression, Lung nodules, and Osteoporosis.  Past Surgical History:  has a past surgical history that includes Tonsillectomy; Colectomy; Hysterectomy; Colonoscopy; Lumbar epidural injection; Cholecystectomy; and laparotomy exploratory.  Social History:   Social History     Tobacco Use   • Smoking status: Former     Packs/day: 2.00     Years: 30.00     Additional pack years: 0.00     Total pack years: 60.00     Types: Cigarettes     Quit date:  "2010     Years since quittin.9   • Smokeless tobacco: Never   Substance Use Topics   • Alcohol use: Yes     Alcohol/week: 5.0 standard drinks of alcohol     Types: 5 Glasses of wine per week     Comment: wine /day - 10 ounces   • Drug use: Never     Medications:   Current Outpatient Medications   Medication Sig Dispense Refill   • ALPRAZolam (XANAX) 0.25 mg tablet Take 0.25 mg by mouth daily as needed.     • cholecalciferol, vitamin D3, (cholecalciferol) 400 unit (10 mcg) tablet tablet Take by mouth daily.     • cyanocobalamin (VITAMIN B12) 100 mcg tablet Take 100 mcg by mouth daily.     • folic acid/multivit-min/lutein (CENTRUM SILVER ORAL) Take by mouth.     • potassium chloride (MICRO-K) 10 mEq CR capsule Take 1 capsule by mouth daily.     • benzonatate (TESSALON) 100 mg capsule Take 100 mg by mouth 3 (three) times a day. Not taking     • estradioL (ESTRACE) 0.01 % (0.1 mg/gram) vaginal cream Not taking     • furosemide (LASIX) 20 mg tablet TAKE ONE TABLET BY MOUTH EVERY DAY AS NEEDED FOR ANKLE SWELLING     • ofloxacin (FLOXIN) 0.3 % otic solution      • phenazopyridine (PYRIDIUM) 200 mg tablet NOT TAKING     • tiotropium bromide (SPIRIVA RESPIMAT) 2.5 mcg/actuation mist inhaler 1 puff daily as needed.       No current facility-administered medications for this visit.       Allergies:   Allergies   Allergen Reactions   • Naproxen Sodium Other (see comments) and GI intolerance   • Dextromethorphan Hbr    • Dextromethorphan Palpitations          Objective   Vitals:   Visit Vitals  BP (!) 172/103 (BP Location: Left upper arm, Patient Position: Sitting)   Pulse 98   Temp 36.1 °C (97 °F)   Ht 1.473 m (4' 10\")   Wt 60.9 kg (134 lb 3.2 oz)   SpO2 96%   BMI 28.05 kg/m²      General: She appears very well and younger than her age  She has admitted kyphosis of her upper thoracic spine.  She ambulates with slight limp but steady gait.   Pulmonary: Good air entry bilaterally and clear   CV: Regular rate and rhythm   " Extremities: Stable bilateral lower extremity edema      Imaging Review: No new imaging for review      Assessment   We are very pleased with her attitude and motivation. She will continue to discuss integrating alternative therapy into her immunotherapy regime with Dr. Browning.  She is extremely appreciative of our care and will keep us informed of how she is doing. We will be happy to remain involved on an as needed basis.

## 2023-12-09 ENCOUNTER — HOSPITAL ENCOUNTER (OUTPATIENT)
Dept: RADIOLOGY | Facility: HOSPITAL | Age: 88
Discharge: HOME | End: 2023-12-09
Attending: INTERNAL MEDICINE
Payer: MEDICARE

## 2023-12-09 VITALS — WEIGHT: 132 LBS | BODY MASS INDEX: 27.59 KG/M2

## 2023-12-09 DIAGNOSIS — C34.12 MALIGNANT NEOPLASM OF UPPER LOBE, LEFT BRONCHUS OR LUNG (CMS/HCC): ICD-10-CM

## 2023-12-09 PROCEDURE — 70553 MRI BRAIN STEM W/O & W/DYE: CPT

## 2023-12-09 PROCEDURE — A9573: HCPCS | Performed by: INTERNAL MEDICINE

## 2023-12-09 PROCEDURE — A9579 GAD-BASE MR CONTRAST NOS,1ML: HCPCS | Performed by: INTERNAL MEDICINE

## 2023-12-09 RX ADMIN — GADOPICLENOL 6 ML: 485.1 INJECTION INTRAVENOUS at 10:39

## 2023-12-13 LAB
FUNGUS SPEC CULT: NORMAL

## 2023-12-27 ENCOUNTER — LAB REQUISITION (OUTPATIENT)
Dept: LAB | Facility: HOSPITAL | Age: 88
End: 2023-12-27
Attending: INTERNAL MEDICINE
Payer: MEDICARE

## 2023-12-27 DIAGNOSIS — C34.12 MALIGNANT NEOPLASM OF UPPER LOBE, LEFT BRONCHUS OR LUNG (CMS/HCC): ICD-10-CM

## 2023-12-27 LAB
ALBUMIN SERPL-MCNC: 3.7 G/DL (ref 3.5–5.7)
ALP SERPL-CCNC: 70 IU/L (ref 34–125)
ALT SERPL-CCNC: 24 IU/L (ref 7–52)
ANION GAP SERPL CALC-SCNC: 9 MEQ/L (ref 3–15)
AST SERPL-CCNC: 17 IU/L (ref 13–39)
BASOPHILS # BLD: 0.04 K/UL (ref 0.01–0.1)
BASOPHILS NFR BLD: 0.8 %
BILIRUB SERPL-MCNC: 0.4 MG/DL (ref 0.3–1.2)
BUN SERPL-MCNC: 21 MG/DL (ref 7–25)
CALCIUM SERPL-MCNC: 10 MG/DL (ref 8.6–10.3)
CHLORIDE SERPL-SCNC: 107 MEQ/L (ref 98–107)
CO2 SERPL-SCNC: 25 MEQ/L (ref 21–31)
CORTIS SERPL-MCNC: 13.8 UG/DL
CREAT SERPL-MCNC: 0.6 MG/DL (ref 0.6–1.2)
DIFFERENTIAL METHOD BLD: ABNORMAL
EGFRCR SERPLBLD CKD-EPI 2021: >60 ML/MIN/1.73M*2
EOSINOPHIL # BLD: 0.07 K/UL (ref 0.04–0.36)
EOSINOPHIL NFR BLD: 1.3 %
ERYTHROCYTE [DISTWIDTH] IN BLOOD BY AUTOMATED COUNT: 11.8 % (ref 11.7–14.4)
GLUCOSE SERPL-MCNC: 121 MG/DL (ref 70–99)
HCT VFR BLDCO AUTO: 43.2 % (ref 35–45)
HGB BLD-MCNC: 14.1 G/DL (ref 11.8–15.7)
IMM GRANULOCYTES # BLD AUTO: 0.02 K/UL (ref 0–0.08)
IMM GRANULOCYTES NFR BLD AUTO: 0.4 %
LYMPHOCYTES # BLD: 0.8 K/UL (ref 1.2–3.5)
LYMPHOCYTES NFR BLD: 15.2 %
MCH RBC QN AUTO: 31.2 PG (ref 28–33.2)
MCHC RBC AUTO-ENTMCNC: 32.6 G/DL (ref 32.2–35.5)
MCV RBC AUTO: 95.6 FL (ref 83–98)
MONOCYTES # BLD: 0.53 K/UL (ref 0.28–0.8)
MONOCYTES NFR BLD: 10 %
NEUTROPHILS # BLD: 3.82 K/UL (ref 1.7–7)
NEUTROPHILS # BLD: 3.82 K/UL (ref 1.7–7)
NEUTS SEG NFR BLD: 72.3 %
NRBC BLD-RTO: 0 %
PDW BLD AUTO: 12.3 FL (ref 9.4–12.3)
PLATELET # BLD AUTO: 212 K/UL (ref 150–369)
POTASSIUM SERPL-SCNC: 4.1 MEQ/L (ref 3.5–5.1)
PROT SERPL-MCNC: 6 G/DL (ref 6–8.2)
RBC # BLD AUTO: 4.52 M/UL (ref 3.93–5.22)
SODIUM SERPL-SCNC: 141 MEQ/L (ref 136–145)
TSH SERPL DL<=0.05 MIU/L-ACNC: 1.5 MIU/L (ref 0.34–5.6)
WBC # BLD AUTO: 5.28 K/UL (ref 3.8–10.5)

## 2023-12-27 PROCEDURE — 84443 ASSAY THYROID STIM HORMONE: CPT | Mod: GZ | Performed by: INTERNAL MEDICINE

## 2023-12-27 PROCEDURE — 80053 COMPREHEN METABOLIC PANEL: CPT | Performed by: INTERNAL MEDICINE

## 2023-12-27 PROCEDURE — 82533 TOTAL CORTISOL: CPT | Performed by: INTERNAL MEDICINE

## 2023-12-27 PROCEDURE — 85025 COMPLETE CBC W/AUTO DIFF WBC: CPT | Performed by: INTERNAL MEDICINE

## 2023-12-28 LAB
MYCOBACTERIUM SPEC CULT: NORMAL

## 2024-01-08 ENCOUNTER — PATIENT OUTREACH (OUTPATIENT)
Dept: RADIATION ONCOLOGY | Facility: HOSPITAL | Age: 89
End: 2024-01-08
Payer: MEDICARE

## 2024-01-17 ENCOUNTER — LAB REQUISITION (OUTPATIENT)
Dept: LAB | Facility: HOSPITAL | Age: 89
End: 2024-01-17
Attending: INTERNAL MEDICINE
Payer: MEDICARE

## 2024-01-17 DIAGNOSIS — Z51.12 ENCOUNTER FOR ANTINEOPLASTIC IMMUNOTHERAPY: ICD-10-CM

## 2024-01-17 DIAGNOSIS — C77.1 SECONDARY AND UNSPECIFIED MALIGNANT NEOPLASM OF INTRATHORACIC LYMPH NODES (CMS/HCC): ICD-10-CM

## 2024-01-17 DIAGNOSIS — C34.12 MALIGNANT NEOPLASM OF UPPER LOBE, LEFT BRONCHUS OR LUNG (CMS/HCC): ICD-10-CM

## 2024-01-17 LAB
ALBUMIN SERPL-MCNC: 3.7 G/DL (ref 3.5–5.7)
ALP SERPL-CCNC: 69 IU/L (ref 34–125)
ALT SERPL-CCNC: 26 IU/L (ref 7–52)
ANION GAP SERPL CALC-SCNC: 7 MEQ/L (ref 3–15)
AST SERPL-CCNC: 21 IU/L (ref 13–39)
BASOPHILS # BLD: 0.04 K/UL (ref 0.01–0.1)
BASOPHILS NFR BLD: 0.8 %
BILIRUB SERPL-MCNC: 0.7 MG/DL (ref 0.3–1.2)
BUN SERPL-MCNC: 17 MG/DL (ref 7–25)
CALCIUM SERPL-MCNC: 9.5 MG/DL (ref 8.6–10.3)
CHLORIDE SERPL-SCNC: 109 MEQ/L (ref 98–107)
CO2 SERPL-SCNC: 25 MEQ/L (ref 21–31)
CORTIS SERPL-MCNC: 16.3 UG/DL
CREAT SERPL-MCNC: 0.6 MG/DL (ref 0.6–1.2)
DIFFERENTIAL METHOD BLD: ABNORMAL
EGFRCR SERPLBLD CKD-EPI 2021: >60 ML/MIN/1.73M*2
EOSINOPHIL # BLD: 0.09 K/UL (ref 0.04–0.36)
EOSINOPHIL NFR BLD: 1.7 %
ERYTHROCYTE [DISTWIDTH] IN BLOOD BY AUTOMATED COUNT: 12.4 % (ref 11.7–14.4)
GLUCOSE SERPL-MCNC: 103 MG/DL (ref 70–99)
HCT VFR BLDCO AUTO: 45.4 % (ref 35–45)
HGB BLD-MCNC: 15.3 G/DL (ref 11.8–15.7)
IMM GRANULOCYTES # BLD AUTO: 0.02 K/UL (ref 0–0.08)
IMM GRANULOCYTES NFR BLD AUTO: 0.4 %
LYMPHOCYTES # BLD: 0.76 K/UL (ref 1.2–3.5)
LYMPHOCYTES NFR BLD: 14.3 %
MCH RBC QN AUTO: 31.6 PG (ref 28–33.2)
MCHC RBC AUTO-ENTMCNC: 33.7 G/DL (ref 32.2–35.5)
MCV RBC AUTO: 93.8 FL (ref 83–98)
MONOCYTES # BLD: 0.57 K/UL (ref 0.28–0.8)
MONOCYTES NFR BLD: 10.8 %
NEUTROPHILS # BLD: 3.82 K/UL (ref 1.7–7)
NEUTROPHILS # BLD: 3.82 K/UL (ref 1.7–7)
NEUTS SEG NFR BLD: 72 %
NRBC BLD-RTO: 0 %
PDW BLD AUTO: 12.9 FL (ref 9.4–12.3)
PLATELET # BLD AUTO: 141 K/UL (ref 150–369)
POTASSIUM SERPL-SCNC: 3.8 MEQ/L (ref 3.5–5.1)
PROT SERPL-MCNC: 5.9 G/DL (ref 6–8.2)
RBC # BLD AUTO: 4.84 M/UL (ref 3.93–5.22)
SODIUM SERPL-SCNC: 141 MEQ/L (ref 136–145)
TSH SERPL DL<=0.05 MIU/L-ACNC: 1.54 MIU/L (ref 0.34–5.6)
WBC # BLD AUTO: 5.3 K/UL (ref 3.8–10.5)

## 2024-01-17 PROCEDURE — 82533 TOTAL CORTISOL: CPT | Performed by: INTERNAL MEDICINE

## 2024-01-17 PROCEDURE — 80053 COMPREHEN METABOLIC PANEL: CPT | Performed by: INTERNAL MEDICINE

## 2024-01-17 PROCEDURE — 84443 ASSAY THYROID STIM HORMONE: CPT | Performed by: INTERNAL MEDICINE

## 2024-01-17 PROCEDURE — 85025 COMPLETE CBC W/AUTO DIFF WBC: CPT | Performed by: INTERNAL MEDICINE

## 2024-02-07 ENCOUNTER — LAB REQUISITION (OUTPATIENT)
Dept: LAB | Facility: HOSPITAL | Age: 89
End: 2024-02-07
Attending: INTERNAL MEDICINE
Payer: MEDICARE

## 2024-02-07 DIAGNOSIS — C34.12 MALIGNANT NEOPLASM OF UPPER LOBE, LEFT BRONCHUS OR LUNG (CMS/HCC): ICD-10-CM

## 2024-02-07 LAB
ALBUMIN SERPL-MCNC: 4 G/DL (ref 3.5–5.7)
ALP SERPL-CCNC: 75 IU/L (ref 34–125)
ALT SERPL-CCNC: 28 IU/L (ref 7–52)
ANION GAP SERPL CALC-SCNC: 6 MEQ/L (ref 3–15)
AST SERPL-CCNC: 19 IU/L (ref 13–39)
BASOPHILS # BLD: 0.04 K/UL (ref 0.01–0.1)
BASOPHILS NFR BLD: 0.7 %
BILIRUB SERPL-MCNC: 0.6 MG/DL (ref 0.3–1.2)
BUN SERPL-MCNC: 23 MG/DL (ref 7–25)
CALCIUM SERPL-MCNC: 10.4 MG/DL (ref 8.6–10.3)
CHLORIDE SERPL-SCNC: 108 MEQ/L (ref 98–107)
CO2 SERPL-SCNC: 28 MEQ/L (ref 21–31)
CORTIS SERPL-MCNC: 10.1 UG/DL
CREAT SERPL-MCNC: 0.5 MG/DL (ref 0.6–1.2)
DIFFERENTIAL METHOD BLD: ABNORMAL
EGFRCR SERPLBLD CKD-EPI 2021: >60 ML/MIN/1.73M*2
EOSINOPHIL # BLD: 0.1 K/UL (ref 0.04–0.36)
EOSINOPHIL NFR BLD: 1.7 %
ERYTHROCYTE [DISTWIDTH] IN BLOOD BY AUTOMATED COUNT: 13 % (ref 11.7–14.4)
GLUCOSE SERPL-MCNC: 94 MG/DL (ref 70–99)
HCT VFR BLD AUTO: 43.1 % (ref 35–45)
HGB BLD-MCNC: 14.6 G/DL (ref 11.8–15.7)
IMM GRANULOCYTES # BLD AUTO: 0.01 K/UL (ref 0–0.08)
IMM GRANULOCYTES NFR BLD AUTO: 0.2 %
LYMPHOCYTES # BLD: 1.05 K/UL (ref 1.2–3.5)
LYMPHOCYTES NFR BLD: 17.7 %
MCH RBC QN AUTO: 30.7 PG (ref 28–33.2)
MCHC RBC AUTO-ENTMCNC: 33.9 G/DL (ref 32.2–35.5)
MCV RBC AUTO: 90.7 FL (ref 83–98)
MONOCYTES # BLD: 0.6 K/UL (ref 0.28–0.8)
MONOCYTES NFR BLD: 10.1 %
NEUTROPHILS # BLD: 4.14 K/UL (ref 1.7–7)
NEUTROPHILS # BLD: 4.14 K/UL (ref 1.7–7)
NEUTS SEG NFR BLD: 69.6 %
NRBC BLD-RTO: 0 %
PDW BLD AUTO: 12.6 FL (ref 9.4–12.3)
PLATELET # BLD AUTO: 188 K/UL (ref 150–369)
POTASSIUM SERPL-SCNC: 4.1 MEQ/L (ref 3.5–5.1)
PROT SERPL-MCNC: 6 G/DL (ref 6–8.2)
RBC # BLD AUTO: 4.75 M/UL (ref 3.93–5.22)
SODIUM SERPL-SCNC: 142 MEQ/L (ref 136–145)
TSH SERPL DL<=0.05 MIU/L-ACNC: 1.53 MIU/L (ref 0.34–5.6)
WBC # BLD AUTO: 5.94 K/UL (ref 3.8–10.5)

## 2024-02-07 PROCEDURE — 85025 COMPLETE CBC W/AUTO DIFF WBC: CPT | Performed by: INTERNAL MEDICINE

## 2024-02-07 PROCEDURE — 84443 ASSAY THYROID STIM HORMONE: CPT | Performed by: INTERNAL MEDICINE

## 2024-02-07 PROCEDURE — 80053 COMPREHEN METABOLIC PANEL: CPT | Performed by: INTERNAL MEDICINE

## 2024-02-07 PROCEDURE — 82533 TOTAL CORTISOL: CPT | Performed by: INTERNAL MEDICINE

## 2024-02-09 ENCOUNTER — PATIENT OUTREACH (OUTPATIENT)
Dept: RADIATION ONCOLOGY | Facility: HOSPITAL | Age: 89
End: 2024-02-09
Payer: MEDICARE

## 2024-02-28 ENCOUNTER — LAB REQUISITION (OUTPATIENT)
Dept: LAB | Facility: HOSPITAL | Age: 89
End: 2024-02-28
Attending: INTERNAL MEDICINE
Payer: MEDICARE

## 2024-02-28 DIAGNOSIS — C34.12 MALIGNANT NEOPLASM OF UPPER LOBE, LEFT BRONCHUS OR LUNG (CMS/HCC): ICD-10-CM

## 2024-02-28 LAB
ALBUMIN SERPL-MCNC: 3.8 G/DL (ref 3.5–5.7)
ALP SERPL-CCNC: 60 IU/L (ref 34–125)
ALT SERPL-CCNC: 26 IU/L (ref 7–52)
ANION GAP SERPL CALC-SCNC: 8 MEQ/L (ref 3–15)
AST SERPL-CCNC: 20 IU/L (ref 13–39)
BASOPHILS # BLD: 0.04 K/UL (ref 0.01–0.1)
BASOPHILS NFR BLD: 0.7 %
BILIRUB SERPL-MCNC: 0.7 MG/DL (ref 0.3–1.2)
BUN SERPL-MCNC: 24 MG/DL (ref 7–25)
CALCIUM SERPL-MCNC: 10 MG/DL (ref 8.6–10.3)
CHLORIDE SERPL-SCNC: 106 MEQ/L (ref 98–107)
CO2 SERPL-SCNC: 25 MEQ/L (ref 21–31)
CORTIS SERPL-MCNC: 11.1 UG/DL
CREAT SERPL-MCNC: 0.6 MG/DL (ref 0.6–1.2)
DIFFERENTIAL METHOD BLD: ABNORMAL
EGFRCR SERPLBLD CKD-EPI 2021: >60 ML/MIN/1.73M*2
EOSINOPHIL # BLD: 0.07 K/UL (ref 0.04–0.36)
EOSINOPHIL NFR BLD: 1.2 %
ERYTHROCYTE [DISTWIDTH] IN BLOOD BY AUTOMATED COUNT: 13.6 % (ref 11.7–14.4)
GLUCOSE SERPL-MCNC: 91 MG/DL (ref 70–99)
HCT VFR BLD AUTO: 42 % (ref 35–45)
HGB BLD-MCNC: 14 G/DL (ref 11.8–15.7)
IMM GRANULOCYTES # BLD AUTO: 0.01 K/UL (ref 0–0.08)
IMM GRANULOCYTES NFR BLD AUTO: 0.2 %
LYMPHOCYTES # BLD: 0.85 K/UL (ref 1.2–3.5)
LYMPHOCYTES NFR BLD: 14.4 %
MCH RBC QN AUTO: 30.6 PG (ref 28–33.2)
MCHC RBC AUTO-ENTMCNC: 33.3 G/DL (ref 32.2–35.5)
MCV RBC AUTO: 91.7 FL (ref 83–98)
MONOCYTES # BLD: 0.54 K/UL (ref 0.28–0.8)
MONOCYTES NFR BLD: 9.2 %
NEUTROPHILS # BLD: 4.38 K/UL (ref 1.7–7)
NEUTROPHILS # BLD: 4.38 K/UL (ref 1.7–7)
NEUTS SEG NFR BLD: 74.3 %
NRBC BLD-RTO: 0 %
PDW BLD AUTO: 13.3 FL (ref 9.4–12.3)
PLATELET # BLD AUTO: 165 K/UL (ref 150–369)
POTASSIUM SERPL-SCNC: 4.6 MEQ/L (ref 3.5–5.1)
PROT SERPL-MCNC: 5.9 G/DL (ref 6–8.2)
RBC # BLD AUTO: 4.58 M/UL (ref 3.93–5.22)
SODIUM SERPL-SCNC: 139 MEQ/L (ref 136–145)
TSH SERPL DL<=0.05 MIU/L-ACNC: 1.41 MIU/L (ref 0.34–5.6)
WBC # BLD AUTO: 5.89 K/UL (ref 3.8–10.5)

## 2024-02-28 PROCEDURE — 80053 COMPREHEN METABOLIC PANEL: CPT | Performed by: INTERNAL MEDICINE

## 2024-02-28 PROCEDURE — 85025 COMPLETE CBC W/AUTO DIFF WBC: CPT | Performed by: INTERNAL MEDICINE

## 2024-02-28 PROCEDURE — 84443 ASSAY THYROID STIM HORMONE: CPT | Mod: GZ | Performed by: INTERNAL MEDICINE

## 2024-02-28 PROCEDURE — 82533 TOTAL CORTISOL: CPT | Performed by: INTERNAL MEDICINE

## 2024-03-12 ENCOUNTER — HOSPITAL ENCOUNTER (OUTPATIENT)
Dept: RADIOLOGY | Facility: CLINIC | Age: 89
Discharge: HOME | End: 2024-03-12
Attending: INTERNAL MEDICINE
Payer: MEDICARE

## 2024-03-12 VITALS — WEIGHT: 126 LBS | BODY MASS INDEX: 26.45 KG/M2 | HEIGHT: 58 IN

## 2024-03-12 DIAGNOSIS — C34.12 MALIGNANT NEOPLASM OF UPPER LOBE, LEFT BRONCHUS OR LUNG (CMS/HCC): ICD-10-CM

## 2024-03-12 RX ORDER — FLUDEOXYGLUCOSE F18 300 MCI/ML
9.01 INJECTION INTRAVENOUS ONCE
Status: COMPLETED | OUTPATIENT
Start: 2024-03-12 | End: 2024-03-12

## 2024-03-12 RX ADMIN — FLUDEOXYGLUCOSE F18 9.01 MILLICURIE: 300 INJECTION INTRAVENOUS at 10:41

## 2024-03-20 ENCOUNTER — LAB REQUISITION (OUTPATIENT)
Dept: LAB | Facility: HOSPITAL | Age: 89
End: 2024-03-20
Attending: INTERNAL MEDICINE
Payer: MEDICARE

## 2024-03-20 DIAGNOSIS — C34.12 MALIGNANT NEOPLASM OF UPPER LOBE, LEFT BRONCHUS OR LUNG (CMS/HCC): ICD-10-CM

## 2024-03-20 LAB
ALBUMIN SERPL-MCNC: 3.8 G/DL (ref 3.5–5.7)
ALP SERPL-CCNC: 69 IU/L (ref 34–125)
ALT SERPL-CCNC: 24 IU/L (ref 7–52)
ANION GAP SERPL CALC-SCNC: 8 MEQ/L (ref 3–15)
AST SERPL-CCNC: 18 IU/L (ref 13–39)
BASOPHILS # BLD: 0.06 K/UL (ref 0.01–0.1)
BASOPHILS NFR BLD: 1 %
BILIRUB SERPL-MCNC: 0.6 MG/DL (ref 0.3–1.2)
BUN SERPL-MCNC: 22 MG/DL (ref 7–25)
CALCIUM SERPL-MCNC: 9.5 MG/DL (ref 8.6–10.3)
CHLORIDE SERPL-SCNC: 106 MEQ/L (ref 98–107)
CO2 SERPL-SCNC: 26 MEQ/L (ref 21–31)
CORTIS SERPL-MCNC: 11.8 UG/DL
CREAT SERPL-MCNC: 0.7 MG/DL (ref 0.6–1.2)
DIFFERENTIAL METHOD BLD: ABNORMAL
EGFRCR SERPLBLD CKD-EPI 2021: >60 ML/MIN/1.73M*2
EOSINOPHIL # BLD: 0.1 K/UL (ref 0.04–0.36)
EOSINOPHIL NFR BLD: 1.7 %
ERYTHROCYTE [DISTWIDTH] IN BLOOD BY AUTOMATED COUNT: 13.9 % (ref 11.7–14.4)
GLUCOSE SERPL-MCNC: 86 MG/DL (ref 70–99)
HCT VFR BLD AUTO: 42.9 % (ref 35–45)
HGB BLD-MCNC: 14.2 G/DL (ref 11.8–15.7)
IMM GRANULOCYTES # BLD AUTO: 0.01 K/UL (ref 0–0.08)
IMM GRANULOCYTES NFR BLD AUTO: 0.2 %
LYMPHOCYTES # BLD: 0.93 K/UL (ref 1.2–3.5)
LYMPHOCYTES NFR BLD: 15.4 %
MCH RBC QN AUTO: 30.7 PG (ref 28–33.2)
MCHC RBC AUTO-ENTMCNC: 33.1 G/DL (ref 32.2–35.5)
MCV RBC AUTO: 92.9 FL (ref 83–98)
MONOCYTES # BLD: 0.59 K/UL (ref 0.28–0.8)
MONOCYTES NFR BLD: 9.8 %
NEUTROPHILS # BLD: 4.33 K/UL (ref 1.7–7)
NEUTROPHILS # BLD: 4.33 K/UL (ref 1.7–7)
NEUTS SEG NFR BLD: 71.9 %
NRBC BLD-RTO: 0 %
PDW BLD AUTO: 13 FL (ref 9.4–12.3)
PLATELET # BLD AUTO: 155 K/UL (ref 150–369)
POTASSIUM SERPL-SCNC: 4.3 MEQ/L (ref 3.5–5.1)
PROT SERPL-MCNC: 5.7 G/DL (ref 6–8.2)
RBC # BLD AUTO: 4.62 M/UL (ref 3.93–5.22)
SODIUM SERPL-SCNC: 140 MEQ/L (ref 136–145)
TSH SERPL DL<=0.05 MIU/L-ACNC: 1.6 MIU/L (ref 0.34–5.6)
WBC # BLD AUTO: 6.02 K/UL (ref 3.8–10.5)

## 2024-03-20 PROCEDURE — 82533 TOTAL CORTISOL: CPT | Performed by: INTERNAL MEDICINE

## 2024-03-20 PROCEDURE — 80053 COMPREHEN METABOLIC PANEL: CPT | Performed by: INTERNAL MEDICINE

## 2024-03-20 PROCEDURE — 84443 ASSAY THYROID STIM HORMONE: CPT | Performed by: INTERNAL MEDICINE

## 2024-03-20 PROCEDURE — 85025 COMPLETE CBC W/AUTO DIFF WBC: CPT | Performed by: INTERNAL MEDICINE

## 2024-04-10 ENCOUNTER — LAB REQUISITION (OUTPATIENT)
Dept: LAB | Facility: HOSPITAL | Age: 89
End: 2024-04-10
Attending: INTERNAL MEDICINE
Payer: MEDICARE

## 2024-04-10 DIAGNOSIS — C34.12 MALIGNANT NEOPLASM OF UPPER LOBE, LEFT BRONCHUS OR LUNG (CMS/HCC): ICD-10-CM

## 2024-04-10 LAB
ALBUMIN SERPL-MCNC: 3.8 G/DL (ref 3.5–5.7)
ALP SERPL-CCNC: 71 IU/L (ref 34–125)
ALT SERPL-CCNC: 24 IU/L (ref 7–52)
ANION GAP SERPL CALC-SCNC: 6 MEQ/L (ref 3–15)
AST SERPL-CCNC: 17 IU/L (ref 13–39)
BASOPHILS # BLD: 0.04 K/UL (ref 0.01–0.1)
BASOPHILS NFR BLD: 1 %
BILIRUB SERPL-MCNC: 0.7 MG/DL (ref 0.3–1.2)
BUN SERPL-MCNC: 19 MG/DL (ref 7–25)
CALCIUM SERPL-MCNC: 9.4 MG/DL (ref 8.6–10.3)
CHLORIDE SERPL-SCNC: 108 MEQ/L (ref 98–107)
CO2 SERPL-SCNC: 25 MEQ/L (ref 21–31)
CORTIS SERPL-MCNC: 10.3 UG/DL
CREAT SERPL-MCNC: 0.6 MG/DL (ref 0.6–1.2)
DIFFERENTIAL METHOD BLD: ABNORMAL
EGFRCR SERPLBLD CKD-EPI 2021: >60 ML/MIN/1.73M*2
EOSINOPHIL # BLD: 0.06 K/UL (ref 0.04–0.36)
EOSINOPHIL NFR BLD: 1.5 %
ERYTHROCYTE [DISTWIDTH] IN BLOOD BY AUTOMATED COUNT: 13 % (ref 11.7–14.4)
GLUCOSE SERPL-MCNC: 92 MG/DL (ref 70–99)
HCT VFR BLD AUTO: 42 % (ref 35–45)
HGB BLD-MCNC: 13.9 G/DL (ref 11.8–15.7)
IMM GRANULOCYTES # BLD AUTO: 0.01 K/UL (ref 0–0.08)
IMM GRANULOCYTES NFR BLD AUTO: 0.3 %
LYMPHOCYTES # BLD: 0.46 K/UL (ref 1.2–3.5)
LYMPHOCYTES NFR BLD: 11.5 %
MCH RBC QN AUTO: 31.3 PG (ref 28–33.2)
MCHC RBC AUTO-ENTMCNC: 33.1 G/DL (ref 32.2–35.5)
MCV RBC AUTO: 94.6 FL (ref 83–98)
MONOCYTES # BLD: 0.38 K/UL (ref 0.28–0.8)
MONOCYTES NFR BLD: 9.5 %
NEUTROPHILS # BLD: 3.04 K/UL (ref 1.7–7)
NEUTROPHILS # BLD: 3.04 K/UL (ref 1.7–7)
NEUTS SEG NFR BLD: 76.2 %
NRBC BLD-RTO: 0 %
OVALOCYTES BLD QL SMEAR: ABNORMAL
PDW BLD AUTO: 12.8 FL (ref 9.4–12.3)
PLAT MORPH BLD: NORMAL
PLATELET # BLD AUTO: 128 K/UL (ref 150–369)
PLATELET # BLD EST: ABNORMAL 10*3/UL
POTASSIUM SERPL-SCNC: 4.4 MEQ/L (ref 3.5–5.1)
PROT SERPL-MCNC: 5.8 G/DL (ref 6–8.2)
RBC # BLD AUTO: 4.44 M/UL (ref 3.93–5.22)
SODIUM SERPL-SCNC: 139 MEQ/L (ref 136–145)
TSH SERPL DL<=0.05 MIU/L-ACNC: 1.68 MIU/L (ref 0.34–5.6)
WBC # BLD AUTO: 3.99 K/UL (ref 3.8–10.5)

## 2024-04-10 PROCEDURE — 84443 ASSAY THYROID STIM HORMONE: CPT | Performed by: INTERNAL MEDICINE

## 2024-04-10 PROCEDURE — 85025 COMPLETE CBC W/AUTO DIFF WBC: CPT | Performed by: INTERNAL MEDICINE

## 2024-04-10 PROCEDURE — 80053 COMPREHEN METABOLIC PANEL: CPT | Performed by: INTERNAL MEDICINE

## 2024-04-10 PROCEDURE — 82533 TOTAL CORTISOL: CPT | Performed by: INTERNAL MEDICINE

## 2024-05-01 ENCOUNTER — LAB REQUISITION (OUTPATIENT)
Dept: LAB | Facility: HOSPITAL | Age: 89
End: 2024-05-01
Attending: INTERNAL MEDICINE
Payer: MEDICARE

## 2024-05-01 DIAGNOSIS — C34.12 MALIGNANT NEOPLASM OF UPPER LOBE, LEFT BRONCHUS OR LUNG (CMS/HCC): ICD-10-CM

## 2024-05-01 LAB
ALBUMIN SERPL-MCNC: 3.7 G/DL (ref 3.5–5.7)
ALP SERPL-CCNC: 57 IU/L (ref 34–125)
ALT SERPL-CCNC: 22 IU/L (ref 7–52)
ANION GAP SERPL CALC-SCNC: 8 MEQ/L (ref 3–15)
AST SERPL-CCNC: 17 IU/L (ref 13–39)
BASOPHILS # BLD: 0.03 K/UL (ref 0.01–0.1)
BASOPHILS NFR BLD: 0.6 %
BILIRUB SERPL-MCNC: 0.7 MG/DL (ref 0.3–1.2)
BUN SERPL-MCNC: 19 MG/DL (ref 7–25)
CALCIUM SERPL-MCNC: 9.4 MG/DL (ref 8.6–10.3)
CHLORIDE SERPL-SCNC: 107 MEQ/L (ref 98–107)
CO2 SERPL-SCNC: 26 MEQ/L (ref 21–31)
CORTIS SERPL-MCNC: 13.7 UG/DL
CREAT SERPL-MCNC: 0.6 MG/DL (ref 0.6–1.2)
DIFFERENTIAL METHOD BLD: ABNORMAL
EGFRCR SERPLBLD CKD-EPI 2021: >60 ML/MIN/1.73M*2
EOSINOPHIL # BLD: 0.07 K/UL (ref 0.04–0.36)
EOSINOPHIL NFR BLD: 1.4 %
ERYTHROCYTE [DISTWIDTH] IN BLOOD BY AUTOMATED COUNT: 12.7 % (ref 11.7–14.4)
GLUCOSE SERPL-MCNC: 97 MG/DL (ref 70–99)
HCT VFR BLD AUTO: 40.7 % (ref 35–45)
HGB BLD-MCNC: 13.6 G/DL (ref 11.8–15.7)
IMM GRANULOCYTES # BLD AUTO: 0.01 K/UL (ref 0–0.08)
IMM GRANULOCYTES NFR BLD AUTO: 0.2 %
LYMPHOCYTES # BLD: 0.87 K/UL (ref 1.2–3.5)
LYMPHOCYTES NFR BLD: 17.9 %
MCH RBC QN AUTO: 31.9 PG (ref 28–33.2)
MCHC RBC AUTO-ENTMCNC: 33.4 G/DL (ref 32.2–35.5)
MCV RBC AUTO: 95.3 FL (ref 83–98)
MONOCYTES # BLD: 0.45 K/UL (ref 0.28–0.8)
MONOCYTES NFR BLD: 9.3 %
NEUTROPHILS # BLD: 3.42 K/UL (ref 1.7–7)
NEUTROPHILS # BLD: 3.42 K/UL (ref 1.7–7)
NEUTS SEG NFR BLD: 70.6 %
NRBC BLD-RTO: 0 %
PDW BLD AUTO: 12.4 FL (ref 9.4–12.3)
PLATELET # BLD AUTO: 136 K/UL (ref 150–369)
POTASSIUM SERPL-SCNC: 3.9 MEQ/L (ref 3.5–5.1)
PROT SERPL-MCNC: 5.7 G/DL (ref 6–8.2)
RBC # BLD AUTO: 4.27 M/UL (ref 3.93–5.22)
SODIUM SERPL-SCNC: 141 MEQ/L (ref 136–145)
TSH SERPL DL<=0.05 MIU/L-ACNC: 1.63 MIU/L (ref 0.34–5.6)
WBC # BLD AUTO: 4.85 K/UL (ref 3.8–10.5)

## 2024-05-01 PROCEDURE — 80053 COMPREHEN METABOLIC PANEL: CPT | Performed by: INTERNAL MEDICINE

## 2024-05-01 PROCEDURE — 82533 TOTAL CORTISOL: CPT | Performed by: INTERNAL MEDICINE

## 2024-05-01 PROCEDURE — 85025 COMPLETE CBC W/AUTO DIFF WBC: CPT | Performed by: INTERNAL MEDICINE

## 2024-05-01 PROCEDURE — 84443 ASSAY THYROID STIM HORMONE: CPT | Mod: GZ | Performed by: INTERNAL MEDICINE

## 2024-05-22 ENCOUNTER — LAB REQUISITION (OUTPATIENT)
Dept: LAB | Facility: HOSPITAL | Age: 89
End: 2024-05-22
Attending: INTERNAL MEDICINE
Payer: MEDICARE

## 2024-05-22 DIAGNOSIS — C34.12 MALIGNANT NEOPLASM OF UPPER LOBE, LEFT BRONCHUS OR LUNG (CMS/HCC): ICD-10-CM

## 2024-05-22 LAB
ALBUMIN SERPL-MCNC: 4 G/DL (ref 3.5–5.7)
ALP SERPL-CCNC: 74 IU/L (ref 34–125)
ALT SERPL-CCNC: 23 IU/L (ref 7–52)
ANION GAP SERPL CALC-SCNC: 7 MEQ/L (ref 3–15)
AST SERPL-CCNC: 19 IU/L (ref 13–39)
BASOPHILS # BLD: 0.06 K/UL (ref 0.01–0.1)
BASOPHILS NFR BLD: 1 %
BILIRUB SERPL-MCNC: 0.7 MG/DL (ref 0.3–1.2)
BUN SERPL-MCNC: 17 MG/DL (ref 7–25)
CALCIUM SERPL-MCNC: 10 MG/DL (ref 8.6–10.3)
CHLORIDE SERPL-SCNC: 106 MEQ/L (ref 98–107)
CO2 SERPL-SCNC: 27 MEQ/L (ref 21–31)
CORTIS SERPL-MCNC: 11.7 UG/DL
CREAT SERPL-MCNC: 0.7 MG/DL (ref 0.6–1.2)
DIFFERENTIAL METHOD BLD: ABNORMAL
EGFRCR SERPLBLD CKD-EPI 2021: >60 ML/MIN/1.73M*2
EOSINOPHIL # BLD: 0.07 K/UL (ref 0.04–0.36)
EOSINOPHIL NFR BLD: 1.2 %
ERYTHROCYTE [DISTWIDTH] IN BLOOD BY AUTOMATED COUNT: 12.3 % (ref 11.7–14.4)
GLUCOSE SERPL-MCNC: 93 MG/DL (ref 70–99)
HCT VFR BLD AUTO: 42.2 % (ref 35–45)
HGB BLD-MCNC: 14.7 G/DL (ref 11.8–15.7)
IMM GRANULOCYTES # BLD AUTO: 0.01 K/UL (ref 0–0.08)
IMM GRANULOCYTES NFR BLD AUTO: 0.2 %
LYMPHOCYTES # BLD: 0.82 K/UL (ref 1.2–3.5)
LYMPHOCYTES NFR BLD: 13.6 %
MCH RBC QN AUTO: 33 PG (ref 28–33.2)
MCHC RBC AUTO-ENTMCNC: 34.8 G/DL (ref 32.2–35.5)
MCV RBC AUTO: 94.6 FL (ref 83–98)
MONOCYTES # BLD: 0.57 K/UL (ref 0.28–0.8)
MONOCYTES NFR BLD: 9.4 %
NEUTROPHILS # BLD: 4.51 K/UL (ref 1.7–7)
NEUTROPHILS # BLD: 4.51 K/UL (ref 1.7–7)
NEUTS SEG NFR BLD: 74.6 %
NRBC BLD-RTO: 0 %
PDW BLD AUTO: 13 FL (ref 9.4–12.3)
PLATELET # BLD AUTO: 163 K/UL (ref 150–369)
POTASSIUM SERPL-SCNC: 4.2 MEQ/L (ref 3.5–5.1)
PROT SERPL-MCNC: 6 G/DL (ref 6–8.2)
RBC # BLD AUTO: 4.46 M/UL (ref 3.93–5.22)
SODIUM SERPL-SCNC: 140 MEQ/L (ref 136–145)
TSH SERPL DL<=0.05 MIU/L-ACNC: 1.86 MIU/L (ref 0.34–5.6)
WBC # BLD AUTO: 6.04 K/UL (ref 3.8–10.5)

## 2024-05-22 PROCEDURE — 82533 TOTAL CORTISOL: CPT | Performed by: INTERNAL MEDICINE

## 2024-05-22 PROCEDURE — 85025 COMPLETE CBC W/AUTO DIFF WBC: CPT | Performed by: INTERNAL MEDICINE

## 2024-05-22 PROCEDURE — 84443 ASSAY THYROID STIM HORMONE: CPT | Performed by: INTERNAL MEDICINE

## 2024-05-22 PROCEDURE — 80053 COMPREHEN METABOLIC PANEL: CPT | Performed by: INTERNAL MEDICINE

## 2024-06-12 ENCOUNTER — LAB REQUISITION (OUTPATIENT)
Dept: LAB | Facility: HOSPITAL | Age: 89
End: 2024-06-12
Attending: INTERNAL MEDICINE
Payer: MEDICARE

## 2024-06-12 DIAGNOSIS — C77.1 SECONDARY AND UNSPECIFIED MALIGNANT NEOPLASM OF INTRATHORACIC LYMPH NODES (CMS/HCC): ICD-10-CM

## 2024-06-12 DIAGNOSIS — C34.12 MALIGNANT NEOPLASM OF UPPER LOBE, LEFT BRONCHUS OR LUNG (CMS/HCC): ICD-10-CM

## 2024-06-12 LAB
ALBUMIN SERPL-MCNC: 3.7 G/DL (ref 3.5–5.7)
ALP SERPL-CCNC: 63 IU/L (ref 34–125)
ALT SERPL-CCNC: 19 IU/L (ref 7–52)
ANION GAP SERPL CALC-SCNC: 6 MEQ/L (ref 3–15)
AST SERPL-CCNC: 15 IU/L (ref 13–39)
BASOPHILS # BLD: 0.03 K/UL (ref 0.01–0.1)
BASOPHILS NFR BLD: 0.6 %
BILIRUB SERPL-MCNC: 0.6 MG/DL (ref 0.3–1.2)
BUN SERPL-MCNC: 16 MG/DL (ref 7–25)
CALCIUM SERPL-MCNC: 9.4 MG/DL (ref 8.6–10.3)
CHLORIDE SERPL-SCNC: 107 MEQ/L (ref 98–107)
CO2 SERPL-SCNC: 27 MEQ/L (ref 21–31)
CORTIS SERPL-MCNC: 8.6 UG/DL
CREAT SERPL-MCNC: 0.6 MG/DL (ref 0.6–1.2)
DIFFERENTIAL METHOD BLD: ABNORMAL
EGFRCR SERPLBLD CKD-EPI 2021: >60 ML/MIN/1.73M*2
EOSINOPHIL # BLD: 0.06 K/UL (ref 0.04–0.36)
EOSINOPHIL NFR BLD: 1.2 %
ERYTHROCYTE [DISTWIDTH] IN BLOOD BY AUTOMATED COUNT: 12.2 % (ref 11.7–14.4)
GLUCOSE SERPL-MCNC: 79 MG/DL (ref 70–99)
HCT VFR BLD AUTO: 40.8 % (ref 35–45)
HGB BLD-MCNC: 13.6 G/DL (ref 11.8–15.7)
IMM GRANULOCYTES # BLD AUTO: 0.01 K/UL (ref 0–0.08)
IMM GRANULOCYTES NFR BLD AUTO: 0.2 %
LYMPHOCYTES # BLD: 0.68 K/UL (ref 1.2–3.5)
LYMPHOCYTES NFR BLD: 13.8 %
MCH RBC QN AUTO: 32.5 PG (ref 28–33.2)
MCHC RBC AUTO-ENTMCNC: 33.3 G/DL (ref 32.2–35.5)
MCV RBC AUTO: 97.4 FL (ref 83–98)
MONOCYTES # BLD: 0.5 K/UL (ref 0.28–0.8)
MONOCYTES NFR BLD: 10.1 %
NEUTROPHILS # BLD: 3.65 K/UL (ref 1.7–7)
NEUTROPHILS # BLD: 3.65 K/UL (ref 1.7–7)
NEUTS SEG NFR BLD: 74.1 %
NRBC BLD-RTO: 0 %
PDW BLD AUTO: 12.9 FL (ref 9.4–12.3)
PLATELET # BLD AUTO: 127 K/UL (ref 150–369)
POTASSIUM SERPL-SCNC: 4.1 MEQ/L (ref 3.5–5.1)
PROT SERPL-MCNC: 5.6 G/DL (ref 6–8.2)
RBC # BLD AUTO: 4.19 M/UL (ref 3.93–5.22)
SODIUM SERPL-SCNC: 140 MEQ/L (ref 136–145)
TSH SERPL DL<=0.05 MIU/L-ACNC: 1.73 MIU/L (ref 0.34–5.6)
WBC # BLD AUTO: 4.93 K/UL (ref 3.8–10.5)

## 2024-06-12 PROCEDURE — 82533 TOTAL CORTISOL: CPT | Performed by: INTERNAL MEDICINE

## 2024-06-12 PROCEDURE — 82040 ASSAY OF SERUM ALBUMIN: CPT | Performed by: INTERNAL MEDICINE

## 2024-06-12 PROCEDURE — 84443 ASSAY THYROID STIM HORMONE: CPT | Mod: GZ | Performed by: INTERNAL MEDICINE

## 2024-06-12 PROCEDURE — 85025 COMPLETE CBC W/AUTO DIFF WBC: CPT | Performed by: INTERNAL MEDICINE

## 2024-06-25 ENCOUNTER — HOSPITAL ENCOUNTER (OUTPATIENT)
Dept: RADIOLOGY | Facility: CLINIC | Age: 89
Discharge: HOME | End: 2024-06-25
Attending: INTERNAL MEDICINE
Payer: MEDICARE

## 2024-06-25 VITALS — HEIGHT: 58 IN | BODY MASS INDEX: 27.5 KG/M2 | WEIGHT: 131 LBS

## 2024-06-25 DIAGNOSIS — C34.12 MALIGNANT NEOPLASM OF UPPER LOBE, LEFT BRONCHUS OR LUNG (CMS/HCC): ICD-10-CM

## 2024-06-25 RX ORDER — FLUDEOXYGLUCOSE F18 300 MCI/ML
8.94 INJECTION INTRAVENOUS ONCE
Status: COMPLETED | OUTPATIENT
Start: 2024-06-25 | End: 2024-06-25

## 2024-06-25 RX ADMIN — FLUDEOXYGLUCOSE F18 8.94 MILLICURIE: 300 INJECTION INTRAVENOUS at 10:28

## 2024-07-03 ENCOUNTER — LAB REQUISITION (OUTPATIENT)
Dept: LAB | Facility: HOSPITAL | Age: 89
End: 2024-07-03
Attending: INTERNAL MEDICINE
Payer: MEDICARE

## 2024-07-03 DIAGNOSIS — C34.12 MALIGNANT NEOPLASM OF UPPER LOBE, LEFT BRONCHUS OR LUNG (CMS/HCC): ICD-10-CM

## 2024-07-03 LAB
ALBUMIN SERPL-MCNC: 4 G/DL (ref 3.5–5.7)
ALP SERPL-CCNC: 61 IU/L (ref 34–125)
ALT SERPL-CCNC: 21 IU/L (ref 7–52)
ANION GAP SERPL CALC-SCNC: 8 MEQ/L (ref 3–15)
AST SERPL-CCNC: 20 IU/L (ref 13–39)
BASOPHILS # BLD: 0.03 K/UL (ref 0.01–0.1)
BASOPHILS NFR BLD: 0.6 %
BILIRUB SERPL-MCNC: 0.6 MG/DL (ref 0.3–1.2)
BUN SERPL-MCNC: 18 MG/DL (ref 7–25)
CALCIUM SERPL-MCNC: 9.5 MG/DL (ref 8.6–10.3)
CHLORIDE SERPL-SCNC: 107 MEQ/L (ref 98–107)
CO2 SERPL-SCNC: 25 MEQ/L (ref 21–31)
CORTIS SERPL-MCNC: 12.9 UG/DL
CREAT SERPL-MCNC: 0.7 MG/DL (ref 0.6–1.2)
DIFFERENTIAL METHOD BLD: ABNORMAL
EGFRCR SERPLBLD CKD-EPI 2021: >60 ML/MIN/1.73M*2
EOSINOPHIL # BLD: 0.06 K/UL (ref 0.04–0.36)
EOSINOPHIL NFR BLD: 1.3 %
ERYTHROCYTE [DISTWIDTH] IN BLOOD BY AUTOMATED COUNT: 12.3 % (ref 11.7–14.4)
GLUCOSE SERPL-MCNC: 94 MG/DL (ref 70–99)
HCT VFR BLD AUTO: 42.5 % (ref 35–45)
HGB BLD-MCNC: 14.3 G/DL (ref 11.8–15.7)
IMM GRANULOCYTES # BLD AUTO: 0.01 K/UL (ref 0–0.08)
IMM GRANULOCYTES NFR BLD AUTO: 0.2 %
LYMPHOCYTES # BLD: 0.75 K/UL (ref 1.2–3.5)
LYMPHOCYTES NFR BLD: 15.8 %
MCH RBC QN AUTO: 32.4 PG (ref 28–33.2)
MCHC RBC AUTO-ENTMCNC: 33.6 G/DL (ref 32.2–35.5)
MCV RBC AUTO: 96.2 FL (ref 83–98)
MONOCYTES # BLD: 0.39 K/UL (ref 0.28–0.8)
MONOCYTES NFR BLD: 8.2 %
NEUTROPHILS # BLD: 3.51 K/UL (ref 1.7–7)
NEUTROPHILS # BLD: 3.51 K/UL (ref 1.7–7)
NEUTS SEG NFR BLD: 73.9 %
NRBC BLD-RTO: 0 %
PDW BLD AUTO: 13.6 FL (ref 9.4–12.3)
PLATELET # BLD AUTO: 127 K/UL (ref 150–369)
POTASSIUM SERPL-SCNC: 4.2 MEQ/L (ref 3.5–5.1)
PROT SERPL-MCNC: 5.9 G/DL (ref 6–8.2)
RBC # BLD AUTO: 4.42 M/UL (ref 3.93–5.22)
SODIUM SERPL-SCNC: 140 MEQ/L (ref 136–145)
TSH SERPL DL<=0.05 MIU/L-ACNC: 1.49 MIU/L (ref 0.34–5.6)
WBC # BLD AUTO: 4.75 K/UL (ref 3.8–10.5)

## 2024-07-03 PROCEDURE — 82533 TOTAL CORTISOL: CPT | Performed by: INTERNAL MEDICINE

## 2024-07-03 PROCEDURE — 80053 COMPREHEN METABOLIC PANEL: CPT | Performed by: INTERNAL MEDICINE

## 2024-07-03 PROCEDURE — 85025 COMPLETE CBC W/AUTO DIFF WBC: CPT | Performed by: INTERNAL MEDICINE

## 2024-07-03 PROCEDURE — 84443 ASSAY THYROID STIM HORMONE: CPT | Performed by: INTERNAL MEDICINE

## 2024-07-24 ENCOUNTER — LAB REQUISITION (OUTPATIENT)
Dept: LAB | Facility: HOSPITAL | Age: 89
End: 2024-07-24
Attending: INTERNAL MEDICINE
Payer: MEDICARE

## 2024-07-24 DIAGNOSIS — C34.12 MALIGNANT NEOPLASM OF UPPER LOBE, LEFT BRONCHUS OR LUNG (CMS/HCC): ICD-10-CM

## 2024-07-24 LAB
BASOPHILS # BLD: 0.05 K/UL (ref 0.01–0.1)
BASOPHILS NFR BLD: 0.9 %
CORTIS SERPL-MCNC: 12.5 UG/DL
DIFFERENTIAL METHOD BLD: ABNORMAL
EOSINOPHIL # BLD: 0.08 K/UL (ref 0.04–0.36)
EOSINOPHIL NFR BLD: 1.4 %
ERYTHROCYTE [DISTWIDTH] IN BLOOD BY AUTOMATED COUNT: 12.3 % (ref 11.7–14.4)
HCT VFR BLD AUTO: 40.8 % (ref 35–45)
HGB BLD-MCNC: 14 G/DL (ref 11.8–15.7)
IMM GRANULOCYTES # BLD AUTO: 0.02 K/UL (ref 0–0.08)
IMM GRANULOCYTES NFR BLD AUTO: 0.3 %
LYMPHOCYTES # BLD: 1.06 K/UL (ref 1.2–3.5)
LYMPHOCYTES NFR BLD: 18.3 %
MCH RBC QN AUTO: 32.8 PG (ref 28–33.2)
MCHC RBC AUTO-ENTMCNC: 34.3 G/DL (ref 32.2–35.5)
MCV RBC AUTO: 95.6 FL (ref 83–98)
MONOCYTES # BLD: 0.54 K/UL (ref 0.28–0.8)
MONOCYTES NFR BLD: 9.3 %
NEUTROPHILS # BLD: 4.04 K/UL (ref 1.7–7)
NEUTROPHILS # BLD: 4.04 K/UL (ref 1.7–7)
NEUTS SEG NFR BLD: 69.8 %
NRBC BLD-RTO: 0 %
PDW BLD AUTO: 12.8 FL (ref 9.4–12.3)
PLATELET # BLD AUTO: 149 K/UL (ref 150–369)
RBC # BLD AUTO: 4.27 M/UL (ref 3.93–5.22)
TSH SERPL DL<=0.05 MIU/L-ACNC: 1.82 MIU/L (ref 0.34–5.6)
WBC # BLD AUTO: 5.79 K/UL (ref 3.8–10.5)

## 2024-07-24 PROCEDURE — 84443 ASSAY THYROID STIM HORMONE: CPT | Performed by: INTERNAL MEDICINE

## 2024-07-24 PROCEDURE — 82533 TOTAL CORTISOL: CPT | Performed by: INTERNAL MEDICINE

## 2024-07-24 PROCEDURE — 85025 COMPLETE CBC W/AUTO DIFF WBC: CPT | Performed by: INTERNAL MEDICINE

## 2024-08-06 ENCOUNTER — HOSPITAL ENCOUNTER (EMERGENCY)
Facility: HOSPITAL | Age: 89
Discharge: HOME | End: 2024-08-07
Attending: EMERGENCY MEDICINE | Admitting: EMERGENCY MEDICINE
Payer: MEDICARE

## 2024-08-06 DIAGNOSIS — S09.90XA HEAD INJURY, INITIAL ENCOUNTER: ICD-10-CM
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
## 2024-08-06 PROCEDURE — 99284 EMERGENCY DEPT VISIT MOD MDM: CPT

## 2024-08-07 ENCOUNTER — APPOINTMENT (EMERGENCY)
Dept: RADIOLOGY | Facility: HOSPITAL | Age: 89
End: 2024-08-07
Attending: EMERGENCY MEDICINE
Payer: MEDICARE

## 2024-08-07 VITALS
TEMPERATURE: 97.2 F | SYSTOLIC BLOOD PRESSURE: 158 MMHG | DIASTOLIC BLOOD PRESSURE: 91 MMHG | WEIGHT: 132 LBS | HEIGHT: 58 IN | BODY MASS INDEX: 27.71 KG/M2 | OXYGEN SATURATION: 95 % | RESPIRATION RATE: 20 BRPM | HEART RATE: 79 BPM

## 2024-08-07 PROCEDURE — 70450 CT HEAD/BRAIN W/O DYE: CPT

## 2024-08-07 PROCEDURE — 72125 CT NECK SPINE W/O DYE: CPT

## 2024-08-07 PROCEDURE — 70480 CT ORBIT/EAR/FOSSA W/O DYE: CPT

## 2024-08-07 ASSESSMENT — ENCOUNTER SYMPTOMS
NECK STIFFNESS: 0
NECK PAIN: 0
MYALGIAS: 0
GASTROINTESTINAL NEGATIVE: 1
MUSCULOSKELETAL NEGATIVE: 1
CONSTITUTIONAL NEGATIVE: 1
WOUND: 1
ARTHRALGIAS: 0
JOINT SWELLING: 0
BACK PAIN: 0
NEUROLOGICAL NEGATIVE: 1
CARDIOVASCULAR NEGATIVE: 1
COLOR CHANGE: 0
RESPIRATORY NEGATIVE: 1

## 2024-08-07 NOTE — ED ATTESTATION NOTE
I have personally seen and examined the patient.  I reviewed and agree with physician assistant / nurse practitioner’s assessment and plan of care.       Exam: Patient is uncomfortable but very stable in no acute distress.  She is mildly hypertensive but the remainder of her vital signs are stable.  Primary survey is negative for trauma.  Secondary survey is positive for right-sided facial wound with a superficial abrasion.  There is marked tenderness ecchymosis and edema overlying the inferior right orbit.  There is no ocular entrapment.  Vision is intact.  There is no epistaxis present.  Patient has some mild right lower extremity pain without obvious deformity.  The remainder of the trauma exam is unremarkable.    Plan: Will check CT of the head, orbits and cervical spine to further evaluate.  Patient was offered pain medication but declined at this time.  Will update tetanus for the superficial facial abrasion         Omer Duque DO  08/06/24 [id number]

## 2024-08-07 NOTE — ED PROVIDER NOTES
Emergency Medicine Note  HPI   HISTORY OF PRESENT ILLNESS     89-year-old female past history of osteoporosis, COPD, bowel obstruction presents for evaluation of fall.  Patient states she had a mechanical trip and fall while trying to get inside her house earlier this evening.  She struck her head but did not lose consciousness.  She denies blood thinners.  Denies preceding chest pain, shortness of breath, diaphoresis.  Suffered a small abrasion to her right cheek.  Denies any other injuries or complaints.  Was able to ambulate after the fall.  Denies neck pain, neck stiffness, visual changes, slurred speech, facial asymmetry.      Trauma  Mechanism of injury: Fall     Current symptoms:       Associated symptoms:             Denies back pain and neck pain.         Patient History   PAST HISTORY     Reviewed from Nursing Triage:       Past Medical History:   Diagnosis Date    Anxiety     Arthritis     Bowel obstruction (CMS/HCC)     Closed fracture of body of sternum     COPD (chronic obstructive pulmonary disease) (CMS/HCC)     COVID-19 vaccine series completed     Depression     Lung nodules     Osteoporosis        Past Surgical History:   Procedure Laterality Date    CHOLECYSTECTOMY      COLECTOMY      COLONOSCOPY      HYSTERECTOMY      LAPAROTOMY EXPLORATORY      LUMBAR EPIDURAL INJECTION      TONSILLECTOMY         Family History   Problem Relation Age of Onset    Hypertension Biological Mother     Brain cancer Biological Father        Social History     Tobacco Use    Smoking status: Former     Packs/day: 2.00     Years: 30.00     Additional pack years: 0.00     Total pack years: 60.00     Types: Cigarettes     Quit date:      Years since quittin.6    Smokeless tobacco: Never   Substance Use Topics    Alcohol use: Yes     Alcohol/week: 5.0 standard drinks of alcohol     Types: 5 Glasses of wine per week     Comment: wine /day - 10 ounces    Drug use: Never         Review of Systems   REVIEW OF SYSTEMS      Review of Systems   Constitutional: Negative.    Respiratory: Negative.     Cardiovascular: Negative.    Gastrointestinal: Negative.    Genitourinary: Negative.    Musculoskeletal: Negative.  Negative for arthralgias, back pain, gait problem, joint swelling, myalgias, neck pain and neck stiffness.   Skin:  Positive for wound. Negative for color change, pallor and rash.   Neurological: Negative.          VITALS     ED Vitals      Date/Time Temp Pulse Resp BP SpO2 [facility]   08/07/24 0136 -- 79 20 158/91 95 % AE   08/06/24 2338 36.2 °C (97.2 °F) 82 20 169/98 95 % AE          Pulse Ox %: 95 % (08/07/24 0020)  Pulse Ox Interpretation: Normal (08/07/24 0020)  Heart Rate: 82 (08/07/24 0020)        Physical Exam   PHYSICAL EXAM     Physical Exam  Vitals and nursing note reviewed.   Constitutional:       General: She is not in acute distress.     Appearance: She is not ill-appearing or toxic-appearing.   HENT:      Head: Normocephalic. Abrasion present. No raccoon eyes, Grimes's sign, contusion, masses, right periorbital erythema, left periorbital erythema or laceration. Hair is normal.      Jaw: There is normal jaw occlusion. No trismus or tenderness.      Comments: Superficial abrasion noted over the right cheekbone with surrounding tenderness.  Eyes:      General: No visual field deficit.     Extraocular Movements: Extraocular movements intact.      Pupils: Pupils are equal, round, and reactive to light.   Neck:      Comments: No midline C-spine tenderness.  Full range of motion the neck without pain or difficulty.  Cardiovascular:      Rate and Rhythm: Normal rate and regular rhythm.      Pulses: Normal pulses.      Heart sounds: Normal heart sounds. No murmur heard.     No friction rub. No gallop.   Pulmonary:      Effort: Pulmonary effort is normal.      Breath sounds: Normal breath sounds. No wheezing, rhonchi or rales.   Chest:      Chest wall: No tenderness.   Abdominal:      General: Abdomen is flat.       Palpations: Abdomen is soft.      Tenderness: There is no abdominal tenderness. There is no right CVA tenderness, left CVA tenderness, guarding or rebound.   Musculoskeletal:         General: No swelling, tenderness, deformity or signs of injury. Normal range of motion.      Cervical back: Normal range of motion and neck supple. No rigidity or tenderness.      Right lower leg: No edema.      Left lower leg: No edema.      Comments: Full range of motion of all extremities without pain or difficulty.  No areas of erythema, ecchymosis.  No areas of tenderness.  No obvious deformities appreciated.  Sensation intact.   Skin:     General: Skin is warm and dry.      Capillary Refill: Capillary refill takes less than 2 seconds.   Neurological:      General: No focal deficit present.      Mental Status: She is alert and oriented to person, place, and time.      Cranial Nerves: Cranial nerves 2-12 are intact. No cranial nerve deficit, dysarthria or facial asymmetry.      Sensory: Sensation is intact. No sensory deficit.      Motor: Motor function is intact. No weakness or pronator drift.      Coordination: Coordination is intact. Finger-Nose-Finger Test normal.           PROCEDURES     Procedures     DATA     Results       None            Imaging Results              CT HEAD WITHOUT IV CONTRAST (Preliminary result)  Result time 08/07/24 01:39:31      Preliminary Interpretation    Patient Name: ANU ZHANG  Exam(s): head / C spine CT  orbit CT  MR#: [id number]       History: FACIAL TRAUMA    Preliminary Impression:    Comparison: MRI brain 12/9/2023    Head:  No evidence of hemorrhage, mass-effect or acute territorial infarction by CT criteria.  No acute calvarial fracture.  Right sublenticular hypodensity, likely representing a dilated perivascular space, less likely a chronic lacunar infarct.  Chronic involutional changes, evidence of chronic small vessel ischemic disease and intracranial atherosclerosis.      Orbits:  No  evidence of acute fracture or dislocation.  Left maxillary paranasal sinus disease.  Torus palatinus.  Dental hardware results in streak artifact limiting evaluation of the adjacent structures.      C-spine:  No evidence of acute C-spine fracture or traumatic subluxation.  Multilevel spine degenerative changes.                Interpreted by: Alton Nolasco MD, Aug 07, 2024 01:38 AM    ANU ZHANG [id number], Aug 07, 2024                                       CT ORBITS AND SELLA WITHOUT IV CONTRAST (Preliminary result)  Result time 08/07/24 01:39:38      Preliminary Interpretation    Patient Name: ANU ZHANG  Exam(s): head / C spine CT  orbit CT  MR#: [id number]       History: FACIAL TRAUMA    Preliminary Impression:    Comparison: MRI brain 12/9/2023    Head:  No evidence of hemorrhage, mass-effect or acute territorial infarction by CT criteria.  No acute calvarial fracture.  Right sublenticular hypodensity, likely representing a dilated perivascular space, less likely a chronic lacunar infarct.  Chronic involutional changes, evidence of chronic small vessel ischemic disease and intracranial atherosclerosis.      Orbits:  No evidence of acute fracture or dislocation.  Left maxillary paranasal sinus disease.  Torus palatinus.  Dental hardware results in streak artifact limiting evaluation of the adjacent structures.      C-spine:  No evidence of acute C-spine fracture or traumatic subluxation.  Multilevel spine degenerative changes.                Interpreted by: Alton Nolasco MD, Aug 07, 2024 01:38 AM    ANU ZHANG [id number], Aug 07, 2024                                       CT CERVICAL SPINE WITHOUT IV CONTRAST (Preliminary result)  Result time 08/07/24 01:39:35      Preliminary Interpretation    Patient Name: ANU ZHANG  Exam(s): head / C spine CT  orbit CT  MR#: [id number]       History: FACIAL TRAUMA    Preliminary Impression:    Comparison: MRI brain 12/9/2023    Head:  No evidence of hemorrhage, mass-effect or  acute territorial infarction by CT criteria.  No acute calvarial fracture.  Right sublenticular hypodensity, likely representing a dilated perivascular space, less likely a chronic lacunar infarct.  Chronic involutional changes, evidence of chronic small vessel ischemic disease and intracranial atherosclerosis.      Orbits:  No evidence of acute fracture or dislocation.  Left maxillary paranasal sinus disease.  Torus palatinus.  Dental hardware results in streak artifact limiting evaluation of the adjacent structures.      C-spine:  No evidence of acute C-spine fracture or traumatic subluxation.  Multilevel spine degenerative changes.                Interpreted by: Alton Nolasco MD, Aug 07, 2024 01:38 AM    ANU ZHANG [id number], Aug 07, 2024                                      No orders to display       Scoring tools                                  ED Course & MDM   MDM / ED COURSE / CLINICAL IMPRESSION / DISPO     Medical Decision Making  Problems Addressed:  Fall, initial encounter: acute illness or injury  Head injury, initial encounter: acute illness or injury    Amount and/or Complexity of Data Reviewed  Radiology: ordered and independent interpretation performed. Decision-making details documented in ED Course.    Risk  Prescription drug management.        ED Course as of 08/07/24 0141   Wed Aug 07, 2024   0139 CT HEAD WITHOUT IV CONTRAST [WL]   0141 CT are negative.  Patient is stable and comfortable for discharge home at this time.  Encouraged outpatient follow-up with PCP for further evaluation and workup.  Return precautions given.  Patient understands and is agreeable.  Questions and concerns answered and discussed. [WL]      ED Course User Index  [WL] Hetal Sr PA C     Clinical Impression      Fall, initial encounter   Head injury, initial encounter     _________________       ED Disposition   Discharge                       Hetal Sr PA C  08/07/24 0141

## 2024-08-07 NOTE — DISCHARGE INSTRUCTIONS
Continue to rest and avoid strenuous activity or heavy lifting.  Take over-the-counter Tylenol or ibuprofen per label as needed for pain.  Apply ice for 20 minutes on 20 minutes off about 3-4 times a day as needed for pain and swelling.  Follow-up with PCP for further evaluation and workup to ensure resolution of symptoms.  Return to ED if any worsening symptoms or concerns.

## 2024-08-14 ENCOUNTER — LAB REQUISITION (OUTPATIENT)
Dept: LAB | Facility: HOSPITAL | Age: 89
End: 2024-08-14
Attending: INTERNAL MEDICINE
Payer: MEDICARE

## 2024-08-14 DIAGNOSIS — C34.12 MALIGNANT NEOPLASM OF UPPER LOBE, LEFT BRONCHUS OR LUNG (CMS/HCC): ICD-10-CM

## 2024-08-14 LAB
ALBUMIN SERPL-MCNC: 4 G/DL (ref 3.5–5.7)
ALP SERPL-CCNC: 65 IU/L (ref 34–125)
ALT SERPL-CCNC: 20 IU/L (ref 7–52)
ANION GAP SERPL CALC-SCNC: 7 MEQ/L (ref 3–15)
AST SERPL-CCNC: 16 IU/L (ref 13–39)
BASOPHILS # BLD: 0.05 K/UL (ref 0.01–0.1)
BASOPHILS NFR BLD: 1.1 %
BILIRUB SERPL-MCNC: 0.6 MG/DL (ref 0.3–1.2)
BUN SERPL-MCNC: 15 MG/DL (ref 7–25)
CALCIUM SERPL-MCNC: 9.7 MG/DL (ref 8.6–10.3)
CHLORIDE SERPL-SCNC: 107 MEQ/L (ref 98–107)
CO2 SERPL-SCNC: 25 MEQ/L (ref 21–31)
CORTIS SERPL-MCNC: 11.4 UG/DL
CREAT SERPL-MCNC: 0.7 MG/DL (ref 0.6–1.2)
DIFFERENTIAL METHOD BLD: ABNORMAL
EGFRCR SERPLBLD CKD-EPI 2021: >60 ML/MIN/1.73M*2
EOSINOPHIL # BLD: 0.06 K/UL (ref 0.04–0.36)
EOSINOPHIL NFR BLD: 1.3 %
ERYTHROCYTE [DISTWIDTH] IN BLOOD BY AUTOMATED COUNT: 12.4 % (ref 11.7–14.4)
GLUCOSE SERPL-MCNC: 97 MG/DL (ref 70–99)
HCT VFR BLD AUTO: 42.2 % (ref 35–45)
HGB BLD-MCNC: 14.2 G/DL (ref 11.8–15.7)
IMM GRANULOCYTES # BLD AUTO: 0.01 K/UL (ref 0–0.08)
IMM GRANULOCYTES NFR BLD AUTO: 0.2 %
LYMPHOCYTES # BLD: 0.77 K/UL (ref 1.2–3.5)
LYMPHOCYTES NFR BLD: 16.7 %
MCH RBC QN AUTO: 32 PG (ref 28–33.2)
MCHC RBC AUTO-ENTMCNC: 33.6 G/DL (ref 32.2–35.5)
MCV RBC AUTO: 95 FL (ref 83–98)
MONOCYTES # BLD: 0.44 K/UL (ref 0.28–0.8)
MONOCYTES NFR BLD: 9.5 %
NEUTROPHILS # BLD: 3.29 K/UL (ref 1.7–7)
NEUTROPHILS # BLD: 3.29 K/UL (ref 1.7–7)
NEUTS SEG NFR BLD: 71.2 %
NRBC BLD-RTO: 0 %
PDW BLD AUTO: 12.8 FL (ref 9.4–12.3)
PLATELET # BLD AUTO: 146 K/UL (ref 150–369)
POTASSIUM SERPL-SCNC: 4.1 MEQ/L (ref 3.5–5.1)
PROT SERPL-MCNC: 5.9 G/DL (ref 6–8.2)
RBC # BLD AUTO: 4.44 M/UL (ref 3.93–5.22)
SODIUM SERPL-SCNC: 139 MEQ/L (ref 136–145)
TSH SERPL DL<=0.05 MIU/L-ACNC: 1.39 MIU/L (ref 0.34–5.6)
WBC # BLD AUTO: 4.62 K/UL (ref 3.8–10.5)

## 2024-08-14 PROCEDURE — 80053 COMPREHEN METABOLIC PANEL: CPT | Performed by: INTERNAL MEDICINE

## 2024-08-14 PROCEDURE — 85025 COMPLETE CBC W/AUTO DIFF WBC: CPT | Performed by: INTERNAL MEDICINE

## 2024-08-14 PROCEDURE — 84443 ASSAY THYROID STIM HORMONE: CPT | Performed by: INTERNAL MEDICINE

## 2024-08-14 PROCEDURE — 82533 TOTAL CORTISOL: CPT | Performed by: INTERNAL MEDICINE

## 2024-09-04 ENCOUNTER — LAB REQUISITION (OUTPATIENT)
Dept: LAB | Facility: HOSPITAL | Age: 89
End: 2024-09-04
Attending: INTERNAL MEDICINE
Payer: MEDICARE

## 2024-09-04 DIAGNOSIS — C34.12 MALIGNANT NEOPLASM OF UPPER LOBE, LEFT BRONCHUS OR LUNG (CMS/HCC): ICD-10-CM

## 2024-09-04 LAB
ALBUMIN SERPL-MCNC: 3.7 G/DL (ref 3.5–5.7)
ALP SERPL-CCNC: 62 IU/L (ref 34–125)
ALT SERPL-CCNC: 21 IU/L (ref 7–52)
ANION GAP SERPL CALC-SCNC: 5 MEQ/L (ref 3–15)
AST SERPL-CCNC: 19 IU/L (ref 13–39)
BASOPHILS # BLD: 0.04 K/UL (ref 0.01–0.1)
BASOPHILS NFR BLD: 0.8 %
BILIRUB SERPL-MCNC: 0.7 MG/DL (ref 0.3–1.2)
BUN SERPL-MCNC: 15 MG/DL (ref 7–25)
CALCIUM SERPL-MCNC: 9.6 MG/DL (ref 8.6–10.3)
CHLORIDE SERPL-SCNC: 109 MEQ/L (ref 98–107)
CO2 SERPL-SCNC: 25 MEQ/L (ref 21–31)
CORTIS SERPL-MCNC: 11.2 UG/DL
CREAT SERPL-MCNC: 0.5 MG/DL (ref 0.6–1.2)
DIFFERENTIAL METHOD BLD: ABNORMAL
EGFRCR SERPLBLD CKD-EPI 2021: >60 ML/MIN/1.73M*2
EOSINOPHIL # BLD: 0.08 K/UL (ref 0.04–0.36)
EOSINOPHIL NFR BLD: 1.6 %
ERYTHROCYTE [DISTWIDTH] IN BLOOD BY AUTOMATED COUNT: 12.5 % (ref 11.7–14.4)
GLUCOSE SERPL-MCNC: 98 MG/DL (ref 70–99)
HCT VFR BLD AUTO: 40.6 % (ref 35–45)
HGB BLD-MCNC: 13.5 G/DL (ref 11.8–15.7)
IMM GRANULOCYTES # BLD AUTO: 0.02 K/UL (ref 0–0.08)
IMM GRANULOCYTES NFR BLD AUTO: 0.4 %
LYMPHOCYTES # BLD: 0.67 K/UL (ref 1.2–3.5)
LYMPHOCYTES NFR BLD: 13.7 %
MCH RBC QN AUTO: 32 PG (ref 28–33.2)
MCHC RBC AUTO-ENTMCNC: 33.3 G/DL (ref 32.2–35.5)
MCV RBC AUTO: 96.2 FL (ref 83–98)
MONOCYTES # BLD: 0.42 K/UL (ref 0.28–0.8)
MONOCYTES NFR BLD: 8.6 %
NEUTROPHILS # BLD: 3.67 K/UL (ref 1.7–7)
NEUTROPHILS # BLD: 3.67 K/UL (ref 1.7–7)
NEUTS SEG NFR BLD: 74.9 %
NRBC BLD-RTO: 0 %
PDW BLD AUTO: 13.4 FL (ref 9.4–12.3)
PLATELET # BLD AUTO: 107 K/UL (ref 150–369)
POTASSIUM SERPL-SCNC: 4.3 MEQ/L (ref 3.5–5.1)
PROT SERPL-MCNC: 5.7 G/DL (ref 6–8.2)
RBC # BLD AUTO: 4.22 M/UL (ref 3.93–5.22)
SODIUM SERPL-SCNC: 139 MEQ/L (ref 136–145)
TSH SERPL DL<=0.05 MIU/L-ACNC: 1.57 MIU/L (ref 0.34–5.6)
WBC # BLD AUTO: 4.9 K/UL (ref 3.8–10.5)

## 2024-09-04 PROCEDURE — 85025 COMPLETE CBC W/AUTO DIFF WBC: CPT | Performed by: INTERNAL MEDICINE

## 2024-09-04 PROCEDURE — 80053 COMPREHEN METABOLIC PANEL: CPT | Performed by: INTERNAL MEDICINE

## 2024-09-04 PROCEDURE — 84443 ASSAY THYROID STIM HORMONE: CPT | Performed by: INTERNAL MEDICINE

## 2024-09-04 PROCEDURE — 82533 TOTAL CORTISOL: CPT | Performed by: INTERNAL MEDICINE

## 2024-09-17 ENCOUNTER — HOSPITAL ENCOUNTER (OUTPATIENT)
Dept: RADIOLOGY | Facility: CLINIC | Age: 89
Discharge: HOME | End: 2024-09-17
Attending: INTERNAL MEDICINE
Payer: MEDICARE

## 2024-09-17 VITALS — BODY MASS INDEX: 28.34 KG/M2 | HEIGHT: 58 IN | WEIGHT: 135 LBS

## 2024-09-17 DIAGNOSIS — C34.12 MALIGNANT NEOPLASM OF UPPER LOBE, LEFT BRONCHUS OR LUNG (CMS/HCC): ICD-10-CM

## 2024-09-17 RX ORDER — FLUDEOXYGLUCOSE F18 300 MCI/ML
9.02 INJECTION INTRAVENOUS ONCE
Status: COMPLETED | OUTPATIENT
Start: 2024-09-17 | End: 2024-09-17

## 2024-09-17 RX ADMIN — FLUDEOXYGLUCOSE F18 9.02 MILLICURIE: 300 INJECTION INTRAVENOUS at 09:55

## 2024-09-25 ENCOUNTER — LAB REQUISITION (OUTPATIENT)
Dept: LAB | Facility: HOSPITAL | Age: 89
End: 2024-09-25
Attending: INTERNAL MEDICINE
Payer: MEDICARE

## 2024-09-25 DIAGNOSIS — Z51.11 ENCOUNTER FOR ANTINEOPLASTIC CHEMOTHERAPY: ICD-10-CM

## 2024-09-25 DIAGNOSIS — C34.12 MALIGNANT NEOPLASM OF UPPER LOBE, LEFT BRONCHUS OR LUNG (CMS/HCC): ICD-10-CM

## 2024-09-25 DIAGNOSIS — D69.6 THROMBOCYTOPENIA, UNSPECIFIED (CMS/HCC): ICD-10-CM

## 2024-09-25 LAB
ALBUMIN SERPL-MCNC: 4 G/DL (ref 3.5–5.7)
ALP SERPL-CCNC: 69 IU/L (ref 34–125)
ALT SERPL-CCNC: 24 IU/L (ref 7–52)
ANION GAP SERPL CALC-SCNC: 8 MEQ/L (ref 3–15)
APTT PPP: 26 SEC (ref 23–35)
AST SERPL-CCNC: 18 IU/L (ref 13–39)
BASOPHILS # BLD: 0.03 K/UL (ref 0.01–0.1)
BASOPHILS NFR BLD: 0.5 %
BILIRUB SERPL-MCNC: 0.8 MG/DL (ref 0.3–1.2)
BUN SERPL-MCNC: 17 MG/DL (ref 7–25)
CALCIUM SERPL-MCNC: 9.8 MG/DL (ref 8.6–10.3)
CHLORIDE SERPL-SCNC: 107 MEQ/L (ref 98–107)
CO2 SERPL-SCNC: 22 MEQ/L (ref 21–31)
CORTIS SERPL-MCNC: 8.3 UG/DL
CREAT SERPL-MCNC: 0.6 MG/DL (ref 0.6–1.2)
DIFFERENTIAL METHOD BLD: ABNORMAL
EGFRCR SERPLBLD CKD-EPI 2021: >60 ML/MIN/1.73M*2
EOSINOPHIL # BLD: 0.03 K/UL (ref 0.04–0.36)
EOSINOPHIL NFR BLD: 0.5 %
ERYTHROCYTE [DISTWIDTH] IN BLOOD BY AUTOMATED COUNT: 12.1 % (ref 11.7–14.4)
FIBRINOGEN PPP-MCNC: 316 MG/DL (ref 220–480)
GLUCOSE SERPL-MCNC: 102 MG/DL (ref 70–99)
HCT VFR BLD AUTO: 41.8 % (ref 35–45)
HGB BLD-MCNC: 14.2 G/DL (ref 11.8–15.7)
IMM GRANULOCYTES # BLD AUTO: 0.01 K/UL (ref 0–0.08)
IMM GRANULOCYTES NFR BLD AUTO: 0.2 %
INR PPP: 1
LYMPHOCYTES # BLD: 0.46 K/UL (ref 1.2–3.5)
LYMPHOCYTES NFR BLD: 7.8 %
MCH RBC QN AUTO: 32.3 PG (ref 28–33.2)
MCHC RBC AUTO-ENTMCNC: 34 G/DL (ref 32.2–35.5)
MCV RBC AUTO: 95.2 FL (ref 83–98)
MONOCYTES # BLD: 0.54 K/UL (ref 0.28–0.8)
MONOCYTES NFR BLD: 9.1 %
NEUTROPHILS # BLD: 4.84 K/UL (ref 1.7–7)
NEUTROPHILS # BLD: 4.84 K/UL (ref 1.7–7)
NEUTS SEG NFR BLD: 81.9 %
NRBC BLD-RTO: 0 %
PDW BLD AUTO: 13.6 FL (ref 9.4–12.3)
PLATELET # BLD AUTO: 112 K/UL (ref 150–369)
POTASSIUM SERPL-SCNC: 4.2 MEQ/L (ref 3.5–5.1)
PROT SERPL-MCNC: 5.8 G/DL (ref 6–8.2)
PROTHROMBIN TIME: 13.4 SEC (ref 12.2–14.5)
RBC # BLD AUTO: 4.39 M/UL (ref 3.93–5.22)
SODIUM SERPL-SCNC: 137 MEQ/L (ref 136–145)
TSH SERPL DL<=0.05 MIU/L-ACNC: 1.22 MIU/L (ref 0.34–5.6)
WBC # BLD AUTO: 5.91 K/UL (ref 3.8–10.5)

## 2024-09-25 PROCEDURE — 85025 COMPLETE CBC W/AUTO DIFF WBC: CPT | Performed by: INTERNAL MEDICINE

## 2024-09-25 PROCEDURE — 85610 PROTHROMBIN TIME: CPT | Performed by: INTERNAL MEDICINE

## 2024-09-25 PROCEDURE — 85730 THROMBOPLASTIN TIME PARTIAL: CPT | Performed by: INTERNAL MEDICINE

## 2024-09-25 PROCEDURE — 82533 TOTAL CORTISOL: CPT | Performed by: INTERNAL MEDICINE

## 2024-09-25 PROCEDURE — 80053 COMPREHEN METABOLIC PANEL: CPT | Performed by: INTERNAL MEDICINE

## 2024-09-25 PROCEDURE — 84443 ASSAY THYROID STIM HORMONE: CPT | Mod: GZ | Performed by: INTERNAL MEDICINE

## 2024-09-25 PROCEDURE — 85384 FIBRINOGEN ACTIVITY: CPT | Performed by: INTERNAL MEDICINE

## 2024-10-16 ENCOUNTER — LAB REQUISITION (OUTPATIENT)
Dept: LAB | Facility: HOSPITAL | Age: 89
End: 2024-10-16
Attending: INTERNAL MEDICINE
Payer: MEDICARE

## 2024-10-16 DIAGNOSIS — C34.12 MALIGNANT NEOPLASM OF UPPER LOBE, LEFT BRONCHUS OR LUNG (CMS/HCC): ICD-10-CM

## 2024-10-16 DIAGNOSIS — Z51.12 ENCOUNTER FOR ANTINEOPLASTIC IMMUNOTHERAPY: ICD-10-CM

## 2024-10-16 LAB
ALBUMIN SERPL-MCNC: 3.8 G/DL (ref 3.5–5.7)
ALP SERPL-CCNC: 68 IU/L (ref 34–125)
ALT SERPL-CCNC: 23 IU/L (ref 7–52)
ANION GAP SERPL CALC-SCNC: 9 MEQ/L (ref 3–15)
AST SERPL-CCNC: 17 IU/L (ref 13–39)
BASOPHILS # BLD: 0.04 K/UL (ref 0.01–0.1)
BASOPHILS NFR BLD: 0.7 %
BILIRUB SERPL-MCNC: 0.8 MG/DL (ref 0.3–1.2)
BUN SERPL-MCNC: 21 MG/DL (ref 7–25)
CALCIUM SERPL-MCNC: 9.6 MG/DL (ref 8.6–10.3)
CHLORIDE SERPL-SCNC: 105 MEQ/L (ref 98–107)
CO2 SERPL-SCNC: 25 MEQ/L (ref 21–31)
CORTIS SERPL-MCNC: 10.9 UG/DL
CREAT SERPL-MCNC: 0.8 MG/DL (ref 0.6–1.2)
DIFFERENTIAL METHOD BLD: ABNORMAL
EGFRCR SERPLBLD CKD-EPI 2021: >60 ML/MIN/1.73M*2
EOSINOPHIL # BLD: 0.06 K/UL (ref 0.04–0.36)
EOSINOPHIL NFR BLD: 1 %
ERYTHROCYTE [DISTWIDTH] IN BLOOD BY AUTOMATED COUNT: 11.9 % (ref 11.7–14.4)
GLUCOSE SERPL-MCNC: 95 MG/DL (ref 70–99)
HCT VFR BLD AUTO: 42.5 % (ref 35–45)
HGB BLD-MCNC: 14.5 G/DL (ref 11.8–15.7)
IMM GRANULOCYTES # BLD AUTO: 0.01 K/UL (ref 0–0.08)
IMM GRANULOCYTES NFR BLD AUTO: 0.2 %
LYMPHOCYTES # BLD: 0.82 K/UL (ref 1.2–3.5)
LYMPHOCYTES NFR BLD: 13.7 %
MCH RBC QN AUTO: 32.2 PG (ref 28–33.2)
MCHC RBC AUTO-ENTMCNC: 34.1 G/DL (ref 32.2–35.5)
MCV RBC AUTO: 94.4 FL (ref 83–98)
MONOCYTES # BLD: 0.49 K/UL (ref 0.28–0.8)
MONOCYTES NFR BLD: 8.2 %
NEUTROPHILS # BLD: 4.55 K/UL (ref 1.7–7)
NEUTROPHILS # BLD: 4.55 K/UL (ref 1.7–7)
NEUTS SEG NFR BLD: 76.2 %
NRBC BLD-RTO: 0 %
PLATELET # BLD AUTO: 135 K/UL (ref 150–369)
PMV BLD AUTO: 13.4 FL (ref 9.4–12.3)
POTASSIUM SERPL-SCNC: 4.3 MEQ/L (ref 3.5–5.1)
PROT SERPL-MCNC: 5.9 G/DL (ref 6–8.2)
RBC # BLD AUTO: 4.5 M/UL (ref 3.93–5.22)
SODIUM SERPL-SCNC: 139 MEQ/L (ref 136–145)
TSH SERPL DL<=0.05 MIU/L-ACNC: 1.18 MIU/L (ref 0.34–5.6)
WBC # BLD AUTO: 5.97 K/UL (ref 3.8–10.5)

## 2024-10-16 PROCEDURE — 82533 TOTAL CORTISOL: CPT | Performed by: INTERNAL MEDICINE

## 2024-10-16 PROCEDURE — 80053 COMPREHEN METABOLIC PANEL: CPT | Performed by: INTERNAL MEDICINE

## 2024-10-16 PROCEDURE — 84443 ASSAY THYROID STIM HORMONE: CPT | Mod: GZ | Performed by: INTERNAL MEDICINE

## 2024-10-16 PROCEDURE — 85025 COMPLETE CBC W/AUTO DIFF WBC: CPT | Performed by: INTERNAL MEDICINE

## 2024-11-06 ENCOUNTER — LAB REQUISITION (OUTPATIENT)
Dept: LAB | Facility: HOSPITAL | Age: 89
End: 2024-11-06
Attending: INTERNAL MEDICINE
Payer: MEDICARE

## 2024-11-06 DIAGNOSIS — C34.12 MALIGNANT NEOPLASM OF UPPER LOBE, LEFT BRONCHUS OR LUNG (CMS/HCC): ICD-10-CM

## 2024-11-06 DIAGNOSIS — D69.6 THROMBOCYTOPENIA, UNSPECIFIED (CMS/HCC): ICD-10-CM

## 2024-11-06 LAB
ALBUMIN SERPL-MCNC: 3.9 G/DL (ref 3.5–5.7)
ALP SERPL-CCNC: 70 IU/L (ref 34–125)
ALT SERPL-CCNC: 30 IU/L (ref 7–52)
ANION GAP SERPL CALC-SCNC: 8 MEQ/L (ref 3–15)
AST SERPL-CCNC: 26 IU/L (ref 13–39)
BASOPHILS # BLD: 0.05 K/UL (ref 0.01–0.1)
BASOPHILS NFR BLD: 1 %
BILIRUB SERPL-MCNC: 0.6 MG/DL (ref 0.3–1.2)
BUN SERPL-MCNC: 18 MG/DL (ref 7–25)
CALCIUM SERPL-MCNC: 9.4 MG/DL (ref 8.6–10.3)
CHLORIDE SERPL-SCNC: 105 MEQ/L (ref 98–107)
CO2 SERPL-SCNC: 22 MEQ/L (ref 21–31)
CORTIS SERPL-MCNC: 12.7 UG/DL
CREAT SERPL-MCNC: 0.7 MG/DL (ref 0.6–1.2)
DIFFERENTIAL METHOD BLD: ABNORMAL
EGFRCR SERPLBLD CKD-EPI 2021: >60 ML/MIN/1.73M*2
EOSINOPHIL # BLD: 0.05 K/UL (ref 0.04–0.36)
EOSINOPHIL NFR BLD: 1 %
ERYTHROCYTE [DISTWIDTH] IN BLOOD BY AUTOMATED COUNT: 12 % (ref 11.7–14.4)
GLUCOSE SERPL-MCNC: 110 MG/DL (ref 70–99)
HCT VFR BLD AUTO: 41.2 % (ref 35–45)
HGB BLD-MCNC: 14 G/DL (ref 11.8–15.7)
IMM GRANULOCYTES # BLD AUTO: 0.01 K/UL (ref 0–0.08)
IMM GRANULOCYTES NFR BLD AUTO: 0.2 %
LYMPHOCYTES # BLD: 0.8 K/UL (ref 1.2–3.5)
LYMPHOCYTES NFR BLD: 16.4 %
MCH RBC QN AUTO: 32.1 PG (ref 28–33.2)
MCHC RBC AUTO-ENTMCNC: 34 G/DL (ref 32.2–35.5)
MCV RBC AUTO: 94.5 FL (ref 83–98)
MONOCYTES # BLD: 0.38 K/UL (ref 0.28–0.8)
MONOCYTES NFR BLD: 7.8 %
NEUTROPHILS # BLD: 3.6 K/UL (ref 1.7–7)
NEUTROPHILS # BLD: 3.6 K/UL (ref 1.7–7)
NEUTS SEG NFR BLD: 73.6 %
NRBC BLD-RTO: 0 %
PLATELET # BLD AUTO: 152 K/UL (ref 150–369)
PMV BLD AUTO: 13.2 FL (ref 9.4–12.3)
POTASSIUM SERPL-SCNC: 4.2 MEQ/L (ref 3.5–5.1)
PROT SERPL-MCNC: 5.8 G/DL (ref 6–8.2)
RBC # BLD AUTO: 4.36 M/UL (ref 3.93–5.22)
SODIUM SERPL-SCNC: 135 MEQ/L (ref 136–145)
TSH SERPL DL<=0.05 MIU/L-ACNC: 1.42 MIU/L (ref 0.34–5.6)
WBC # BLD AUTO: 4.89 K/UL (ref 3.8–10.5)

## 2024-11-06 PROCEDURE — 84443 ASSAY THYROID STIM HORMONE: CPT | Performed by: INTERNAL MEDICINE

## 2024-11-06 PROCEDURE — 85025 COMPLETE CBC W/AUTO DIFF WBC: CPT | Performed by: INTERNAL MEDICINE

## 2024-11-06 PROCEDURE — 82533 TOTAL CORTISOL: CPT | Performed by: INTERNAL MEDICINE

## 2024-11-06 PROCEDURE — 80053 COMPREHEN METABOLIC PANEL: CPT | Performed by: INTERNAL MEDICINE

## 2024-11-27 ENCOUNTER — LAB REQUISITION (OUTPATIENT)
Dept: LAB | Facility: HOSPITAL | Age: 89
End: 2024-11-27
Attending: INTERNAL MEDICINE
Payer: MEDICARE

## 2024-11-27 DIAGNOSIS — C34.12 MALIGNANT NEOPLASM OF UPPER LOBE, LEFT BRONCHUS OR LUNG (CMS/HCC): ICD-10-CM

## 2024-11-27 LAB
ALBUMIN SERPL-MCNC: 3.8 G/DL (ref 3.5–5.7)
ALP SERPL-CCNC: 74 IU/L (ref 34–125)
ALT SERPL-CCNC: 23 IU/L (ref 7–52)
ANION GAP SERPL CALC-SCNC: 5 MEQ/L (ref 3–15)
AST SERPL-CCNC: 18 IU/L (ref 13–39)
BASOPHILS # BLD: 0.05 K/UL (ref 0.01–0.1)
BASOPHILS NFR BLD: 1 %
BILIRUB SERPL-MCNC: 0.7 MG/DL (ref 0.3–1.2)
BUN SERPL-MCNC: 12 MG/DL (ref 7–25)
CALCIUM SERPL-MCNC: 9.2 MG/DL (ref 8.6–10.3)
CHLORIDE SERPL-SCNC: 107 MEQ/L (ref 98–107)
CO2 SERPL-SCNC: 27 MEQ/L (ref 21–31)
CORTIS SERPL-MCNC: 13.6 UG/DL
CREAT SERPL-MCNC: 0.6 MG/DL (ref 0.6–1.2)
DIFFERENTIAL METHOD BLD: ABNORMAL
EGFRCR SERPLBLD CKD-EPI 2021: >60 ML/MIN/1.73M*2
EOSINOPHIL # BLD: 0.06 K/UL (ref 0.04–0.36)
EOSINOPHIL NFR BLD: 1.2 %
ERYTHROCYTE [DISTWIDTH] IN BLOOD BY AUTOMATED COUNT: 12.2 % (ref 11.7–14.4)
GLUCOSE SERPL-MCNC: 91 MG/DL (ref 70–99)
HCT VFR BLD AUTO: 41.3 % (ref 35–45)
HGB BLD-MCNC: 13.5 G/DL (ref 11.8–15.7)
IMM GRANULOCYTES # BLD AUTO: 0.01 K/UL (ref 0–0.08)
IMM GRANULOCYTES NFR BLD AUTO: 0.2 %
LYMPHOCYTES # BLD: 0.77 K/UL (ref 1.2–3.5)
LYMPHOCYTES NFR BLD: 15.2 %
MCH RBC QN AUTO: 31.3 PG (ref 28–33.2)
MCHC RBC AUTO-ENTMCNC: 32.7 G/DL (ref 32.2–35.5)
MCV RBC AUTO: 95.6 FL (ref 83–98)
MONOCYTES # BLD: 0.4 K/UL (ref 0.28–0.8)
MONOCYTES NFR BLD: 7.9 %
NEUTROPHILS # BLD: 3.78 K/UL (ref 1.7–7)
NEUTROPHILS # BLD: 3.78 K/UL (ref 1.7–7)
NEUTS SEG NFR BLD: 74.5 %
NRBC BLD-RTO: 0 %
PLATELET # BLD AUTO: 161 K/UL (ref 150–369)
PMV BLD AUTO: 13 FL (ref 9.4–12.3)
POTASSIUM SERPL-SCNC: 4.1 MEQ/L (ref 3.5–5.1)
PROT SERPL-MCNC: 5.7 G/DL (ref 6–8.2)
RBC # BLD AUTO: 4.32 M/UL (ref 3.93–5.22)
SODIUM SERPL-SCNC: 139 MEQ/L (ref 136–145)
TSH SERPL DL<=0.05 MIU/L-ACNC: 1.44 MIU/L (ref 0.34–5.6)
WBC # BLD AUTO: 5.07 K/UL (ref 3.8–10.5)

## 2024-11-27 PROCEDURE — 82533 TOTAL CORTISOL: CPT | Performed by: INTERNAL MEDICINE

## 2024-11-27 PROCEDURE — 84443 ASSAY THYROID STIM HORMONE: CPT | Mod: GZ | Performed by: INTERNAL MEDICINE

## 2024-11-27 PROCEDURE — 85025 COMPLETE CBC W/AUTO DIFF WBC: CPT | Performed by: INTERNAL MEDICINE

## 2024-11-27 PROCEDURE — 80053 COMPREHEN METABOLIC PANEL: CPT | Performed by: INTERNAL MEDICINE

## 2024-12-18 ENCOUNTER — LAB REQUISITION (OUTPATIENT)
Dept: LAB | Facility: HOSPITAL | Age: 89
End: 2024-12-18
Attending: INTERNAL MEDICINE
Payer: MEDICARE

## 2024-12-18 DIAGNOSIS — C34.12 MALIGNANT NEOPLASM OF UPPER LOBE, LEFT BRONCHUS OR LUNG (CMS/HCC): ICD-10-CM

## 2024-12-18 DIAGNOSIS — Z51.11 ENCOUNTER FOR ANTINEOPLASTIC CHEMOTHERAPY: ICD-10-CM

## 2024-12-18 LAB
ALBUMIN SERPL-MCNC: 4 G/DL (ref 3.5–5.7)
ALP SERPL-CCNC: 76 IU/L (ref 34–125)
ALT SERPL-CCNC: 26 IU/L (ref 7–52)
ANION GAP SERPL CALC-SCNC: 4 MEQ/L (ref 3–15)
AST SERPL-CCNC: 19 IU/L (ref 13–39)
BASOPHILS # BLD: 0.05 K/UL (ref 0.01–0.1)
BASOPHILS NFR BLD: 0.9 %
BILIRUB SERPL-MCNC: 0.7 MG/DL (ref 0.3–1.2)
BUN SERPL-MCNC: 14 MG/DL (ref 7–25)
CALCIUM SERPL-MCNC: 9.9 MG/DL (ref 8.6–10.3)
CHLORIDE SERPL-SCNC: 107 MEQ/L (ref 98–107)
CO2 SERPL-SCNC: 29 MEQ/L (ref 21–31)
CORTIS SERPL-MCNC: 9.9 UG/DL
CREAT SERPL-MCNC: 0.5 MG/DL (ref 0.6–1.2)
DIFFERENTIAL METHOD BLD: ABNORMAL
EGFRCR SERPLBLD CKD-EPI 2021: >60 ML/MIN/1.73M*2
EOSINOPHIL # BLD: 0.07 K/UL (ref 0.04–0.36)
EOSINOPHIL NFR BLD: 1.2 %
ERYTHROCYTE [DISTWIDTH] IN BLOOD BY AUTOMATED COUNT: 12.4 % (ref 11.7–14.4)
GLUCOSE SERPL-MCNC: 98 MG/DL (ref 70–99)
HCT VFR BLD AUTO: 43.1 % (ref 35–45)
HGB BLD-MCNC: 14.3 G/DL (ref 11.8–15.7)
IMM GRANULOCYTES # BLD AUTO: 0.02 K/UL (ref 0–0.08)
IMM GRANULOCYTES NFR BLD AUTO: 0.4 %
LYMPHOCYTES # BLD: 0.75 K/UL (ref 1.2–3.5)
LYMPHOCYTES NFR BLD: 13.3 %
MCH RBC QN AUTO: 31.7 PG (ref 28–33.2)
MCHC RBC AUTO-ENTMCNC: 33.2 G/DL (ref 32.2–35.5)
MCV RBC AUTO: 95.6 FL (ref 83–98)
MONOCYTES # BLD: 0.49 K/UL (ref 0.28–0.8)
MONOCYTES NFR BLD: 8.7 %
NEUTROPHILS # BLD: 4.24 K/UL (ref 1.7–7)
NEUTROPHILS # BLD: 4.24 K/UL (ref 1.7–7)
NEUTS SEG NFR BLD: 75.5 %
NRBC BLD-RTO: 0 %
PLATELET # BLD AUTO: 139 K/UL (ref 150–369)
PMV BLD AUTO: 13 FL (ref 9.4–12.3)
POTASSIUM SERPL-SCNC: 4.2 MEQ/L (ref 3.5–5.1)
PROT SERPL-MCNC: 6 G/DL (ref 6–8.2)
RBC # BLD AUTO: 4.51 M/UL (ref 3.93–5.22)
SODIUM SERPL-SCNC: 140 MEQ/L (ref 136–145)
TSH SERPL DL<=0.05 MIU/L-ACNC: 1.17 MIU/L (ref 0.34–5.6)
WBC # BLD AUTO: 5.62 K/UL (ref 3.8–10.5)

## 2024-12-18 PROCEDURE — 85025 COMPLETE CBC W/AUTO DIFF WBC: CPT | Performed by: INTERNAL MEDICINE

## 2024-12-18 PROCEDURE — 84443 ASSAY THYROID STIM HORMONE: CPT | Mod: GZ | Performed by: INTERNAL MEDICINE

## 2024-12-18 PROCEDURE — 80053 COMPREHEN METABOLIC PANEL: CPT | Performed by: INTERNAL MEDICINE

## 2024-12-18 PROCEDURE — 82533 TOTAL CORTISOL: CPT | Performed by: INTERNAL MEDICINE

## 2025-01-04 ENCOUNTER — APPOINTMENT (EMERGENCY)
Dept: RADIOLOGY | Facility: HOSPITAL | Age: 89
End: 2025-01-04
Payer: MEDICARE

## 2025-01-04 ENCOUNTER — HOSPITAL ENCOUNTER (EMERGENCY)
Facility: HOSPITAL | Age: 89
Discharge: HOME | End: 2025-01-05
Attending: EMERGENCY MEDICINE | Admitting: EMERGENCY MEDICINE
Payer: MEDICARE
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
## 2025-01-04 DIAGNOSIS — S42.214A CLOSED NONDISPLACED FRACTURE OF SURGICAL NECK OF RIGHT HUMERUS, UNSPECIFIED FRACTURE MORPHOLOGY, INITIAL ENCOUNTER: ICD-10-CM

## 2025-01-04 PROCEDURE — 63700000 HC SELF-ADMINISTRABLE DRUG: Performed by: PHYSICIAN ASSISTANT

## 2025-01-04 PROCEDURE — 72125 CT NECK SPINE W/O DYE: CPT

## 2025-01-04 PROCEDURE — 70450 CT HEAD/BRAIN W/O DYE: CPT

## 2025-01-04 PROCEDURE — 73060 X-RAY EXAM OF HUMERUS: CPT | Mod: RT

## 2025-01-04 PROCEDURE — 99284 EMERGENCY DEPT VISIT MOD MDM: CPT | Mod: 25

## 2025-01-04 PROCEDURE — 0PSCXZZ REPOSITION RIGHT HUMERAL HEAD, EXTERNAL APPROACH: ICD-10-PCS | Performed by: EMERGENCY MEDICINE

## 2025-01-04 PROCEDURE — 23600 CLTX PROX HUMRL FX W/O MNPJ: CPT | Mod: RT

## 2025-01-04 RX ORDER — TRAMADOL HYDROCHLORIDE 50 MG/1
50 TABLET ORAL ONCE
Status: COMPLETED | OUTPATIENT
Start: 2025-01-04 | End: 2025-01-04

## 2025-01-04 RX ADMIN — TRAMADOL HYDROCHLORIDE 50 MG: 50 TABLET, COATED ORAL at 22:58

## 2025-01-04 ASSESSMENT — ENCOUNTER SYMPTOMS
ARTHRALGIAS: 1
HEADACHES: 1
LIGHT-HEADEDNESS: 0
COLOR CHANGE: 1
DIZZINESS: 0
NECK PAIN: 0

## 2025-01-05 VITALS
DIASTOLIC BLOOD PRESSURE: 79 MMHG | HEART RATE: 74 BPM | SYSTOLIC BLOOD PRESSURE: 161 MMHG | HEIGHT: 59 IN | WEIGHT: 142.86 LBS | RESPIRATION RATE: 20 BRPM | BODY MASS INDEX: 28.8 KG/M2 | OXYGEN SATURATION: 95 % | TEMPERATURE: 98.7 F

## 2025-01-05 PROCEDURE — 63700000 HC SELF-ADMINISTRABLE DRUG: Performed by: PHYSICIAN ASSISTANT

## 2025-01-05 RX ORDER — HYDROCODONE BITARTRATE AND ACETAMINOPHEN 5; 325 MG/1; MG/1
1 TABLET ORAL ONCE
Status: COMPLETED | OUTPATIENT
Start: 2025-01-05 | End: 2025-01-05

## 2025-01-05 RX ORDER — HYDROCODONE BITARTRATE AND ACETAMINOPHEN 5; 325 MG/1; MG/1
1 TABLET ORAL EVERY 6 HOURS PRN
Qty: 8 TABLET | Refills: 0 | Status: SHIPPED | OUTPATIENT
Start: 2025-01-05 | End: 2025-01-07

## 2025-01-05 RX ADMIN — HYDROCODONE BITARTRATE AND ACETAMINOPHEN 1 TABLET: 5; 325 TABLET ORAL at 00:53

## 2025-01-05 NOTE — ED ATTESTATION NOTE
Procedures  Physical Exam  Review of Systems    1/5/202512:43 PM  I have personally seen and examined the patient.  I reviewed and agree with the PA/NP/Resident's assessment and plan of care.    Vital Signs Review: Vital signs have been reviewed. The oxygen saturation is  SpO2: 96 % which is  Normal    My examination, assessment, and plan of care of Nicole Goncalves is as follows:      Patient Presents with 89-year-old female comes in after ground-level fall denies any loss of consciousness    Exam: Patient is otherwise well-appearing having pain in her right upper extremity good distal pulses      Impression/Plan: Patient had a ground-level fall on her right upper extremity pain with shoulder fracture noted put in sling and recommend Ortho follow-up      Please refer to work-up tab for further management and follow-up on laboratory and imaging evaluations         I was physically present for the key/critical portions of the procedures documented by JULI    This document was created using dragon dictation software.  There might be some typographical errors due to this technology.       Macario Ramírez MD  01/05/25 [id number]

## 2025-01-05 NOTE — ED PROVIDER NOTES
HPI   HISTORY OF PRESENT ILLNESS     Patient is an 89 year old female past medical history as shown below presents to the emergency department stating that she had a mechanical fall while in the elevator.  She states that there is a raised mat and she tripped on this hitting her head and injuring her right arm.  Patient denies loss of consciousness and being on blood thinners.  She states that she is having a difficult time moving her right upper extremity due to pain.      History provided by:  Patient   used: No          Patient History   PAST HISTORY     Reviewed from Nursing Triage:       Past Medical History:   Diagnosis Date    Anxiety     Arthritis     Bowel obstruction (CMS/HCC)     Closed fracture of body of sternum     COPD (chronic obstructive pulmonary disease) (CMS/HCC)     COVID-19 vaccine series completed     Depression     Lung nodules     Osteoporosis        Past Surgical History   Procedure Laterality Date    Cholecystectomy      Colectomy      Colonoscopy      Hysterectomy      ION NAVIGATIONAL BRONCHOSCOPY FOR LUNG BIOPSY,  ENDOBRONCHIAL ULTRASOUND FOR LYMPH NODE BIOPSY N/A 11/15/2023    Performed by Dalton Crump MD PhD at [facility]    Laparotomy exploratory      Lumbar epidural injection      Tonsillectomy         Family History   Problem Relation Name Age of Onset    Hypertension Biological Mother      Brain cancer Biological Father         Social History     Tobacco Use    Smoking status: Former     Current packs/day: 0.00     Average packs/day: 2.0 packs/day for 30.0 years (60.0 ttl pk-yrs)     Types: Cigarettes     Start date: 1980     Quit date: 2010     Years since quitting: 15.0    Smokeless tobacco: Never   Substance Use Topics    Alcohol use: Yes     Alcohol/week: 5.0 standard drinks of alcohol     Types: 5 Glasses of wine per week     Comment: wine /day - 10 ounces    Drug use: Never         Review of Systems   REVIEW OF SYSTEMS     Review of Systems    Musculoskeletal:  Positive for arthralgias. Negative for neck pain.   Skin:  Positive for color change.   Neurological:  Positive for headaches. Negative for dizziness and light-headedness.   All other systems reviewed and are negative.        VITALS     ED Vitals      Date/Time Temp Pulse Resp BP SpO2 [facility]   01/05/25 0045 -- 74 20 161/79 95 % AP   01/04/25 2343 37.1 °C (98.7 °F) 73 18 152/83 94 % AP   01/04/25 2242 35.3 °C (95.6 °F) 82 16 189/97 96 % JBD                         Physical Exam   PHYSICAL EXAM     Physical Exam  Vitals and nursing note reviewed.   Constitutional:       General: She is not in acute distress.     Appearance: Normal appearance. She is not ill-appearing, toxic-appearing or diaphoretic.   HENT:      Head: Normocephalic.      Comments: Ecchymosis to forehead.   Eyes:      General:         Right eye: No discharge.         Left eye: No discharge.      Extraocular Movements: Extraocular movements intact.      Conjunctiva/sclera: Conjunctivae normal.      Pupils: Pupils are equal, round, and reactive to light.   Cardiovascular:      Pulses: Normal pulses.   Pulmonary:      Effort: Pulmonary effort is normal. No respiratory distress.   Musculoskeletal:         General: Tenderness present. No deformity or signs of injury.      Cervical back: Normal range of motion. No tenderness.      Comments: Tenderness to R proximal R humeral shaft.    Skin:     General: Skin is dry.      Capillary Refill: Capillary refill takes less than 2 seconds.   Neurological:      General: No focal deficit present.      Mental Status: She is alert and oriented to person, place, and time.      Comments: GCS 15           PROCEDURES     Procedures     DATA     Results       None            Imaging Results              X-RAY HUMERUS RIGHT (Preliminary result)  Result time 01/05/25 00:01:25      Preliminary Interpretation    Acute humeral neck fx. MN RW                                     CT HEAD WITHOUT IV CONTRAST  (Preliminary result)  Result time 01/05/25 00:41:00      Preliminary Interpretation    Preliminary Impression:    CT HEAD WITHOUT IV CONTRAST    No acute intracranial parenchymal hemorrhage, mass effect, midline shift, or extra-axial fluid collection.  No large vascular territorial infarct. Right chronic gangliocapsular infarct.  Scattered nonspecific hypodensities in the periventricular and subcortical white matter, most likely due to microangiopathic changes.  Ventricles, basal cisterns and cortical sulci are mildly prominent, consistent with involutional changes.  Bilateral lens replacement.  Atherosclerotic calcification of the intracranial arteries.  Mucosal thickening within the paranasal sinuses.  Visualized mastoid air cells are clear.  The calvarium and soft tissues are unremarkable.    IMPRESSION:  No acute intracranial hemorrhage, large vascular territorial infarct, or mass effect.    --------------      CT C-SPINE:    ALIGNMENT: The alignment is within normal limit.  VERTEBRA: No evidence of acute vertebral fracture. Degenerative changes.  DISC: The interver  tebral disc spaces are narrowed.  PARAVERTEBRAL SOFT TISSUES: Unremarkable.    IMPRESSION:    No evidence of acute vertebral fracture.      Interpreted by: Celia Nagel MD, Jan 05, 2025 12:35 AM                                        CT CERVICAL SPINE WITHOUT IV CONTRAST (Preliminary result)  Result time 01/05/25 00:41:03      Preliminary Interpretation    Preliminary Impression:    CT HEAD WITHOUT IV CONTRAST    No acute intracranial parenchymal hemorrhage, mass effect, midline shift, or extra-axial fluid collection.  No large vascular territorial infarct. Right chronic gangliocapsular infarct.  Scattered nonspecific hypodensities in the periventricular and subcortical white matter, most likely due to microangiopathic changes.  Ventricles, basal cisterns and cortical sulci are mildly prominent, consistent with involutional changes.  Bilateral lens  replacement.  Atherosclerotic calcification of the intracranial arteries.  Mucosal thickening within the paranasal sinuses.  Visualized mastoid air cells are clear.  The calvarium and soft tissues are unremarkable.    IMPRESSION:  No acute intracranial hemorrhage, large vascular territorial infarct, or mass effect.    --------------      CT C-SPINE:    ALIGNMENT: The alignment is within normal limit.  VERTEBRA: No evidence of acute vertebral fracture. Degenerative changes.  DISC: The interver  tebral disc spaces are narrowed.  PARAVERTEBRAL SOFT TISSUES: Unremarkable.    IMPRESSION:    No evidence of acute vertebral fracture.      Interpreted by: Celia Nagel MD, Jan 05, 2025 12:35 AM                                       No orders to display       Scoring tools                                  ED Course & MDM   MDM / ED COURSE / CLINICAL IMPRESSION / DISPO     Medical Decision Making  Patient is an 89-year-old female presenting to the emergency department after mechanical fall hitting her head and sustaining right upper arm injury.  She is awake alert and oriented x 3 with a GCS of 15.  No thinners or loss of consciousness. CT's negative.  My review and interpretation of her x-ray shows an acute fracture of the right humeral neck.  Placed in a sling, pain medications prescribed to the pharmacy, recommended outpatient follow-up with orthopedics.  Discussed return precautions.  Patient agreed to and verbalized understanding to treatment plan.    Problems Addressed:  Closed nondisplaced fracture of surgical neck of right humerus, unspecified fracture morphology, initial encounter: acute illness or injury  Fall, initial encounter: acute illness or injury    Amount and/or Complexity of Data Reviewed  Radiology: ordered and independent interpretation performed. Decision-making details documented in ED Course.    Risk  Prescription drug management.        Vital Signs Review: Vital signs have been reviewed. The oxygen  saturation is SpO2: 96 % which is normal.       Clinical Impression      Fall, initial encounter   Closed nondisplaced fracture of surgical neck of right humerus, unspecified fracture morphology, initial encounter     _________________       ED Disposition   Discharge                     Miguel A Sanchez PA C  01/05/25 0057

## 2025-01-05 NOTE — DISCHARGE INSTRUCTIONS
You were seen and evaluated in the emergency department after a fall.  Your x-ray does show a right sided humerus fracture.  Please wear the sling at all times.  Take pain medications as needed for severe pain and schedule follow-up with orthopedics.  Please return if you have any worsening or concerning symptoms.

## 2025-01-08 ENCOUNTER — LAB REQUISITION (OUTPATIENT)
Dept: LAB | Facility: HOSPITAL | Age: 89
End: 2025-01-08
Attending: INTERNAL MEDICINE
Payer: MEDICARE

## 2025-01-08 DIAGNOSIS — C34.12 MALIGNANT NEOPLASM OF UPPER LOBE, LEFT BRONCHUS OR LUNG (CMS/HCC): ICD-10-CM

## 2025-01-08 DIAGNOSIS — Z51.12 ENCOUNTER FOR ANTINEOPLASTIC IMMUNOTHERAPY: ICD-10-CM

## 2025-01-08 LAB
ALBUMIN SERPL-MCNC: 3.9 G/DL (ref 3.5–5.7)
ALP SERPL-CCNC: 79 IU/L (ref 34–125)
ALT SERPL-CCNC: 21 IU/L (ref 7–52)
ANION GAP SERPL CALC-SCNC: 6 MEQ/L (ref 3–15)
AST SERPL-CCNC: 17 IU/L (ref 13–39)
BASOPHILS # BLD: 0.05 K/UL (ref 0.01–0.1)
BASOPHILS NFR BLD: 0.8 %
BILIRUB SERPL-MCNC: 0.6 MG/DL (ref 0.3–1.2)
BUN SERPL-MCNC: 19 MG/DL (ref 7–25)
CALCIUM SERPL-MCNC: 9.4 MG/DL (ref 8.6–10.3)
CHLORIDE SERPL-SCNC: 104 MEQ/L (ref 98–107)
CO2 SERPL-SCNC: 27 MEQ/L (ref 21–31)
CORTIS SERPL-MCNC: 15.5 UG/DL
CREAT SERPL-MCNC: 0.6 MG/DL (ref 0.6–1.2)
DIFFERENTIAL METHOD BLD: ABNORMAL
EGFRCR SERPLBLD CKD-EPI 2021: >60 ML/MIN/1.73M*2
EOSINOPHIL # BLD: 0.04 K/UL (ref 0.04–0.36)
EOSINOPHIL NFR BLD: 0.6 %
ERYTHROCYTE [DISTWIDTH] IN BLOOD BY AUTOMATED COUNT: 12.3 % (ref 11.7–14.4)
GLUCOSE SERPL-MCNC: 93 MG/DL (ref 70–99)
HCT VFR BLD AUTO: 41.2 % (ref 35–45)
HGB BLD-MCNC: 14 G/DL (ref 11.8–15.7)
IMM GRANULOCYTES # BLD AUTO: 0.03 K/UL (ref 0–0.08)
IMM GRANULOCYTES NFR BLD AUTO: 0.5 %
LYMPHOCYTES # BLD: 0.74 K/UL (ref 1.2–3.5)
LYMPHOCYTES NFR BLD: 11.2 %
MCH RBC QN AUTO: 32.2 PG (ref 28–33.2)
MCHC RBC AUTO-ENTMCNC: 34 G/DL (ref 32.2–35.5)
MCV RBC AUTO: 94.7 FL (ref 83–98)
MONOCYTES # BLD: 0.6 K/UL (ref 0.28–0.8)
MONOCYTES NFR BLD: 9 %
NEUTROPHILS # BLD: 5.17 K/UL (ref 1.7–7)
NEUTROPHILS # BLD: 5.17 K/UL (ref 1.7–7)
NEUTS SEG NFR BLD: 77.9 %
NRBC BLD-RTO: 0 %
PLATELET # BLD AUTO: 162 K/UL (ref 150–369)
PMV BLD AUTO: 13 FL (ref 9.4–12.3)
POTASSIUM SERPL-SCNC: 4.2 MEQ/L (ref 3.5–5.1)
PROT SERPL-MCNC: 5.8 G/DL (ref 6–8.2)
RBC # BLD AUTO: 4.35 M/UL (ref 3.93–5.22)
SODIUM SERPL-SCNC: 137 MEQ/L (ref 136–145)
TSH SERPL DL<=0.05 MIU/L-ACNC: 1.22 MIU/L (ref 0.34–5.6)
WBC # BLD AUTO: 6.63 K/UL (ref 3.8–10.5)

## 2025-01-08 PROCEDURE — 85025 COMPLETE CBC W/AUTO DIFF WBC: CPT | Performed by: INTERNAL MEDICINE

## 2025-01-08 PROCEDURE — 80053 COMPREHEN METABOLIC PANEL: CPT | Performed by: INTERNAL MEDICINE

## 2025-01-08 PROCEDURE — 84443 ASSAY THYROID STIM HORMONE: CPT | Mod: GZ | Performed by: INTERNAL MEDICINE

## 2025-01-08 PROCEDURE — 82533 TOTAL CORTISOL: CPT | Performed by: INTERNAL MEDICINE

## 2025-01-18 ENCOUNTER — TRANSCRIBE ORDERS (OUTPATIENT)
Dept: SCHEDULING | Age: 89
End: 2025-01-18

## 2025-01-18 DIAGNOSIS — S42.202S UNSPECIFIED FRACTURE OF UPPER END OF LEFT HUMERUS, SEQUELA: Primary | ICD-10-CM

## 2025-01-21 ENCOUNTER — HOSPITAL ENCOUNTER (OUTPATIENT)
Dept: RADIOLOGY | Facility: CLINIC | Age: 89
Discharge: HOME | End: 2025-01-21
Attending: INTERNAL MEDICINE
Payer: MEDICARE

## 2025-01-21 DIAGNOSIS — S42.202S UNSPECIFIED FRACTURE OF UPPER END OF LEFT HUMERUS, SEQUELA: ICD-10-CM

## 2025-01-22 ENCOUNTER — HOSPITAL ENCOUNTER (OUTPATIENT)
Dept: RADIOLOGY | Facility: CLINIC | Age: 89
Discharge: HOME | End: 2025-01-22
Attending: INTERNAL MEDICINE
Payer: MEDICARE

## 2025-01-22 VITALS — BODY MASS INDEX: 27.06 KG/M2 | WEIGHT: 134 LBS

## 2025-01-22 DIAGNOSIS — S42.202S UNSPECIFIED FRACTURE OF UPPER END OF LEFT HUMERUS, SEQUELA: ICD-10-CM

## 2025-01-22 RX ORDER — GADOBUTROL 604.72 MG/ML
0.1 INJECTION INTRAVENOUS ONCE
Status: COMPLETED | OUTPATIENT
Start: 2025-01-22 | End: 2025-01-22

## 2025-01-22 RX ADMIN — GADOBUTROL 6.1 ML: 604.72 INJECTION INTRAVENOUS at 14:54

## 2025-01-28 ENCOUNTER — HOSPITAL ENCOUNTER (OUTPATIENT)
Dept: RADIOLOGY | Facility: CLINIC | Age: 89
Discharge: HOME | End: 2025-01-28
Attending: INTERNAL MEDICINE
Payer: MEDICARE

## 2025-01-28 VITALS — WEIGHT: 134 LBS | BODY MASS INDEX: 28.13 KG/M2 | HEIGHT: 58 IN

## 2025-01-28 DIAGNOSIS — C34.12 MALIGNANT NEOPLASM OF UPPER LOBE, LEFT BRONCHUS OR LUNG (CMS/HCC): ICD-10-CM

## 2025-01-28 RX ORDER — FLUDEOXYGLUCOSE F18 300 MCI/ML
8.98 INJECTION INTRAVENOUS ONCE
Status: COMPLETED | OUTPATIENT
Start: 2025-01-28 | End: 2025-01-28

## 2025-01-28 RX ADMIN — FLUDEOXYGLUCOSE F18 8.98 MILLICURIE: 300 INJECTION INTRAVENOUS at 10:33

## 2025-01-29 ENCOUNTER — LAB REQUISITION (OUTPATIENT)
Dept: LAB | Facility: HOSPITAL | Age: 89
End: 2025-01-29
Attending: INTERNAL MEDICINE
Payer: MEDICARE

## 2025-01-29 DIAGNOSIS — Z51.12 ENCOUNTER FOR ANTINEOPLASTIC IMMUNOTHERAPY: ICD-10-CM

## 2025-01-29 DIAGNOSIS — C34.12 MALIGNANT NEOPLASM OF UPPER LOBE, LEFT BRONCHUS OR LUNG (CMS/HCC): ICD-10-CM

## 2025-01-29 LAB
ALBUMIN SERPL-MCNC: 3.9 G/DL (ref 3.5–5.7)
ALP SERPL-CCNC: 89 IU/L (ref 34–125)
ALT SERPL-CCNC: 22 IU/L (ref 7–52)
ANION GAP SERPL CALC-SCNC: 9 MEQ/L (ref 3–15)
AST SERPL-CCNC: 20 IU/L (ref 13–39)
BASOPHILS # BLD: 0.05 K/UL (ref 0.01–0.1)
BASOPHILS NFR BLD: 0.9 %
BILIRUB SERPL-MCNC: 0.5 MG/DL (ref 0.3–1.2)
BUN SERPL-MCNC: 16 MG/DL (ref 7–25)
CALCIUM SERPL-MCNC: 9.6 MG/DL (ref 8.6–10.3)
CHLORIDE SERPL-SCNC: 106 MEQ/L (ref 98–107)
CO2 SERPL-SCNC: 26 MEQ/L (ref 21–31)
CORTIS SERPL-MCNC: 14.9 UG/DL
CREAT SERPL-MCNC: 0.6 MG/DL (ref 0.6–1.2)
DIFFERENTIAL METHOD BLD: ABNORMAL
EGFRCR SERPLBLD CKD-EPI 2021: >60 ML/MIN/1.73M*2
EOSINOPHIL # BLD: 0.05 K/UL (ref 0.04–0.36)
EOSINOPHIL NFR BLD: 0.9 %
ERYTHROCYTE [DISTWIDTH] IN BLOOD BY AUTOMATED COUNT: 12.4 % (ref 11.7–14.4)
GLUCOSE SERPL-MCNC: 100 MG/DL (ref 70–99)
HCT VFR BLD AUTO: 42.6 % (ref 35–45)
HGB BLD-MCNC: 14.3 G/DL (ref 11.8–15.7)
IMM GRANULOCYTES # BLD AUTO: 0.02 K/UL (ref 0–0.08)
IMM GRANULOCYTES NFR BLD AUTO: 0.3 %
LYMPHOCYTES # BLD: 0.8 K/UL (ref 1.2–3.5)
LYMPHOCYTES NFR BLD: 13.7 %
MCH RBC QN AUTO: 31.9 PG (ref 28–33.2)
MCHC RBC AUTO-ENTMCNC: 33.6 G/DL (ref 32.2–35.5)
MCV RBC AUTO: 95.1 FL (ref 83–98)
MONOCYTES # BLD: 0.58 K/UL (ref 0.28–0.8)
MONOCYTES NFR BLD: 9.9 %
NEUTROPHILS # BLD: 4.36 K/UL (ref 1.7–7)
NEUTROPHILS # BLD: 4.36 K/UL (ref 1.7–7)
NEUTS SEG NFR BLD: 74.3 %
NRBC BLD-RTO: 0 %
PLATELET # BLD AUTO: 140 K/UL (ref 150–369)
PMV BLD AUTO: 13.4 FL (ref 9.4–12.3)
POTASSIUM SERPL-SCNC: 4.1 MEQ/L (ref 3.5–5.1)
PROT SERPL-MCNC: 5.6 G/DL (ref 6–8.2)
RBC # BLD AUTO: 4.48 M/UL (ref 3.93–5.22)
SODIUM SERPL-SCNC: 141 MEQ/L (ref 136–145)
TSH SERPL DL<=0.05 MIU/L-ACNC: 1.66 MIU/L (ref 0.34–5.6)
WBC # BLD AUTO: 5.86 K/UL (ref 3.8–10.5)

## 2025-01-29 PROCEDURE — 80053 COMPREHEN METABOLIC PANEL: CPT | Performed by: INTERNAL MEDICINE

## 2025-01-29 PROCEDURE — 85025 COMPLETE CBC W/AUTO DIFF WBC: CPT | Performed by: INTERNAL MEDICINE

## 2025-01-29 PROCEDURE — 82533 TOTAL CORTISOL: CPT | Performed by: INTERNAL MEDICINE

## 2025-01-29 PROCEDURE — 84443 ASSAY THYROID STIM HORMONE: CPT | Mod: GZ | Performed by: INTERNAL MEDICINE

## 2025-02-19 ENCOUNTER — LAB REQUISITION (OUTPATIENT)
Dept: LAB | Facility: HOSPITAL | Age: 89
End: 2025-02-19
Attending: INTERNAL MEDICINE
Payer: MEDICARE

## 2025-02-19 DIAGNOSIS — C34.12 MALIGNANT NEOPLASM OF UPPER LOBE, LEFT BRONCHUS OR LUNG (CMS/HCC): ICD-10-CM

## 2025-02-19 LAB
ALBUMIN SERPL-MCNC: 3.5 G/DL (ref 3.5–5.7)
ALP SERPL-CCNC: 72 IU/L (ref 34–125)
ALT SERPL-CCNC: 24 IU/L (ref 7–52)
ANION GAP SERPL CALC-SCNC: 7 MEQ/L (ref 3–15)
AST SERPL-CCNC: 18 IU/L (ref 13–39)
BASOPHILS # BLD: 0.05 K/UL (ref 0.01–0.1)
BASOPHILS NFR BLD: 0.8 %
BILIRUB SERPL-MCNC: 0.6 MG/DL (ref 0.3–1.2)
BUN SERPL-MCNC: 16 MG/DL (ref 7–25)
CALCIUM SERPL-MCNC: 9.4 MG/DL (ref 8.6–10.3)
CHLORIDE SERPL-SCNC: 108 MEQ/L (ref 98–107)
CO2 SERPL-SCNC: 25 MEQ/L (ref 21–31)
CORTIS SERPL-MCNC: 10.2 UG/DL
CREAT SERPL-MCNC: 0.6 MG/DL (ref 0.6–1.2)
DIFFERENTIAL METHOD BLD: ABNORMAL
EGFRCR SERPLBLD CKD-EPI 2021: >60 ML/MIN/1.73M*2
EOSINOPHIL # BLD: 0.07 K/UL (ref 0.04–0.36)
EOSINOPHIL NFR BLD: 1.1 %
ERYTHROCYTE [DISTWIDTH] IN BLOOD BY AUTOMATED COUNT: 12.2 % (ref 11.7–14.4)
GLUCOSE SERPL-MCNC: 96 MG/DL (ref 70–99)
HCT VFR BLD AUTO: 42.5 % (ref 35–45)
HGB BLD-MCNC: 14.3 G/DL (ref 11.8–15.7)
IMM GRANULOCYTES # BLD AUTO: 0.02 K/UL (ref 0–0.08)
IMM GRANULOCYTES NFR BLD AUTO: 0.3 %
LYMPHOCYTES # BLD: 1.1 K/UL (ref 1.2–3.5)
LYMPHOCYTES NFR BLD: 17.8 %
MCH RBC QN AUTO: 32.3 PG (ref 28–33.2)
MCHC RBC AUTO-ENTMCNC: 33.6 G/DL (ref 32.2–35.5)
MCV RBC AUTO: 95.9 FL (ref 83–98)
MONOCYTES # BLD: 0.53 K/UL (ref 0.28–0.8)
MONOCYTES NFR BLD: 8.6 %
NEUTROPHILS # BLD: 4.41 K/UL (ref 1.7–7)
NEUTROPHILS # BLD: 4.41 K/UL (ref 1.7–7)
NEUTS SEG NFR BLD: 71.4 %
NRBC BLD-RTO: 0 %
PLATELET # BLD AUTO: 149 K/UL (ref 150–369)
PMV BLD AUTO: 13.1 FL (ref 9.4–12.3)
POTASSIUM SERPL-SCNC: 4.1 MEQ/L (ref 3.5–5.1)
PROT SERPL-MCNC: 5.4 G/DL (ref 6–8.2)
RBC # BLD AUTO: 4.43 M/UL (ref 3.93–5.22)
SODIUM SERPL-SCNC: 140 MEQ/L (ref 136–145)
TSH SERPL DL<=0.05 MIU/L-ACNC: 1.47 MIU/L (ref 0.34–5.6)
WBC # BLD AUTO: 6.18 K/UL (ref 3.8–10.5)

## 2025-02-19 PROCEDURE — 85025 COMPLETE CBC W/AUTO DIFF WBC: CPT | Performed by: INTERNAL MEDICINE

## 2025-02-19 PROCEDURE — 80053 COMPREHEN METABOLIC PANEL: CPT | Performed by: INTERNAL MEDICINE

## 2025-02-19 PROCEDURE — 82533 TOTAL CORTISOL: CPT | Performed by: INTERNAL MEDICINE

## 2025-02-19 PROCEDURE — 84443 ASSAY THYROID STIM HORMONE: CPT | Mod: GZ | Performed by: INTERNAL MEDICINE

## 2025-03-12 ENCOUNTER — LAB REQUISITION (OUTPATIENT)
Dept: LAB | Facility: HOSPITAL | Age: 89
End: 2025-03-12
Attending: INTERNAL MEDICINE
Payer: MEDICARE

## 2025-03-12 DIAGNOSIS — C34.12 MALIGNANT NEOPLASM OF UPPER LOBE, LEFT BRONCHUS OR LUNG (CMS/HCC): ICD-10-CM

## 2025-03-12 LAB
ALBUMIN SERPL-MCNC: 3.8 G/DL (ref 3.5–5.7)
ALP SERPL-CCNC: 79 IU/L (ref 34–125)
ALT SERPL-CCNC: 27 IU/L (ref 7–52)
ANION GAP SERPL CALC-SCNC: 8 MEQ/L (ref 3–15)
AST SERPL-CCNC: 21 IU/L (ref 13–39)
BASOPHILS # BLD: 0.05 K/UL (ref 0.01–0.1)
BASOPHILS NFR BLD: 0.8 %
BILIRUB SERPL-MCNC: 0.6 MG/DL (ref 0.3–1.2)
BUN SERPL-MCNC: 14 MG/DL (ref 7–25)
CALCIUM SERPL-MCNC: 9.5 MG/DL (ref 8.6–10.3)
CHLORIDE SERPL-SCNC: 105 MEQ/L (ref 98–107)
CO2 SERPL-SCNC: 27 MEQ/L (ref 21–31)
CORTIS SERPL-MCNC: 16.7 UG/DL
CREAT SERPL-MCNC: 0.6 MG/DL (ref 0.6–1.2)
DIFFERENTIAL METHOD BLD: ABNORMAL
EGFRCR SERPLBLD CKD-EPI 2021: >60 ML/MIN/1.73M*2
EOSINOPHIL # BLD: 0.1 K/UL (ref 0.04–0.36)
EOSINOPHIL NFR BLD: 1.6 %
ERYTHROCYTE [DISTWIDTH] IN BLOOD BY AUTOMATED COUNT: 12.2 % (ref 11.7–14.4)
GLUCOSE SERPL-MCNC: 102 MG/DL (ref 70–99)
HCT VFR BLD AUTO: 42.4 % (ref 35–45)
HGB BLD-MCNC: 14.2 G/DL (ref 11.8–15.7)
IMM GRANULOCYTES # BLD AUTO: 0.01 K/UL (ref 0–0.08)
IMM GRANULOCYTES NFR BLD AUTO: 0.2 %
LYMPHOCYTES # BLD: 0.89 K/UL (ref 1.2–3.5)
LYMPHOCYTES NFR BLD: 14.4 %
MCH RBC QN AUTO: 31.8 PG (ref 28–33.2)
MCHC RBC AUTO-ENTMCNC: 33.5 G/DL (ref 32.2–35.5)
MCV RBC AUTO: 94.9 FL (ref 83–98)
MONOCYTES # BLD: 0.59 K/UL (ref 0.28–0.8)
MONOCYTES NFR BLD: 9.5 %
NEUTROPHILS # BLD: 4.54 K/UL (ref 1.7–7)
NEUTROPHILS # BLD: 4.54 K/UL (ref 1.7–7)
NEUTS SEG NFR BLD: 73.5 %
NRBC BLD-RTO: 0 %
PLATELET # BLD AUTO: 140 K/UL (ref 150–369)
PMV BLD AUTO: 13.8 FL (ref 9.4–12.3)
POTASSIUM SERPL-SCNC: 3.7 MEQ/L (ref 3.5–5.1)
PROT SERPL-MCNC: 5.8 G/DL (ref 6–8.2)
RBC # BLD AUTO: 4.47 M/UL (ref 3.93–5.22)
SODIUM SERPL-SCNC: 140 MEQ/L (ref 136–145)
TSH SERPL DL<=0.05 MIU/L-ACNC: 1.87 MIU/L (ref 0.34–5.6)
WBC # BLD AUTO: 6.18 K/UL (ref 3.8–10.5)

## 2025-03-12 PROCEDURE — 85025 COMPLETE CBC W/AUTO DIFF WBC: CPT | Performed by: INTERNAL MEDICINE

## 2025-03-12 PROCEDURE — 84443 ASSAY THYROID STIM HORMONE: CPT | Mod: GZ | Performed by: INTERNAL MEDICINE

## 2025-03-12 PROCEDURE — 82533 TOTAL CORTISOL: CPT | Performed by: INTERNAL MEDICINE

## 2025-03-12 PROCEDURE — 80053 COMPREHEN METABOLIC PANEL: CPT | Performed by: INTERNAL MEDICINE

## 2025-03-25 ENCOUNTER — HOSPITAL ENCOUNTER (OUTPATIENT)
Dept: RADIOLOGY | Facility: CLINIC | Age: 89
Discharge: HOME | End: 2025-03-25
Attending: INTERNAL MEDICINE
Payer: MEDICARE

## 2025-03-25 VITALS — WEIGHT: 136 LBS | HEIGHT: 59 IN | BODY MASS INDEX: 27.42 KG/M2

## 2025-03-25 DIAGNOSIS — C34.12 MALIGNANT NEOPLASM OF UPPER LOBE, LEFT BRONCHUS OR LUNG (CMS/HCC): ICD-10-CM

## 2025-03-25 RX ORDER — FLUDEOXYGLUCOSE F18 300 MCI/ML
8.92 INJECTION INTRAVENOUS ONCE
Status: COMPLETED | OUTPATIENT
Start: 2025-03-25 | End: 2025-03-25

## 2025-03-25 RX ADMIN — FLUDEOXYGLUCOSE F18 8.92 MILLICURIE: 300 INJECTION INTRAVENOUS at 10:07

## 2025-04-01 NOTE — PROGRESS NOTES
CONSULTATION    Name: Nicole Goncalves                                                             : 1934         Date of Service: 2025    Diagnosis   Metastatic Lung cancer    11/15/2023 - S/p Left upper lobe Lung Biopsy with Dr. Dalton Crump  Adenocarcinoma, right hilum, level 4R & left 7 also + for adenocarcinoma, PDL 1 90%  Dec 2023 - Keytruda    Radiation Summary  No previous history of radiation    History of Present Illness  Nicole Goncalves is a very pleasant 89 y.o. female who was seen at the request of Dr.Molly Browning for discussion of radiation treatment for metastatic lung cancer to the mediastinal and hilar lymph nodes .  On review of patient's past medical records and discussion with the patient, patient's relevant oncologic history is as follows:       Nicole was diagnosed with adenocarcinoma of the lung in 2023.  Bone scan on 2023 showed uptake in the mid sternum. MRI/brain on 2023 showed an 8 mm frontal meningioma, but no evidence of metastases.     She met with Dr. Browning, and under her direction initiated Keytruda on 2023.     PET/CT on 3/12/2024 showed significant improvement in the pulmonary nodules, mediastinal and hilar adenopathy. There was one intense focus of increased activity - anterior to the mainstem bronchus, showing mild activity and a new small focus of activity corresponding to a node lying between the descending aorta and esophagus. Additional PET/CT's every 3 months showed stable disease, until imaging on 2025, which showed interval increase in hypermetabolic mediastinal and right hilar lymphadenopathy 2024.    PET/CT on 3/25/2025 showed interval increase in hypermetabolic mediastinal and right hilar lymphadenopathy compared to 2025.    She now presents to me for discussion of radiation therapy.     On 10 point review of systems, patient denies any pain, changes in vision or hearing, fevers, chills, chest tightness, heart  palpitations, shortness of breath, abdominal pain, muscle pain, bone pain, progressive bony pain, leg swelling, or un-intentional weight loss.    Allergies   Allergen Reactions    Naproxen Sodium Other (see comments) and GI intolerance    Dextromethorphan Hbr     Dextromethorphan Palpitations     Current Outpatient Medications   Medication Sig Dispense Refill    ALPRAZolam (XANAX) 0.25 mg tablet Take 0.25 mg by mouth daily as needed.      benzonatate (TESSALON) 100 mg capsule Take 100 mg by mouth 3 (three) times a day. Not taking      cholecalciferol, vitamin D3, (cholecalciferol) 400 unit (10 mcg) tablet tablet Take by mouth daily.      cyanocobalamin (VITAMIN B12) 100 mcg tablet Take 100 mcg by mouth daily.      estradioL (ESTRACE) 0.01 % (0.1 mg/gram) vaginal cream Not taking      folic acid/multivit-min/lutein (CENTRUM SILVER ORAL) Take by mouth.      furosemide (LASIX) 20 mg tablet TAKE ONE TABLET BY MOUTH EVERY DAY AS NEEDED FOR ANKLE SWELLING      ofloxacin (FLOXIN) 0.3 % otic solution       phenazopyridine (PYRIDIUM) 200 mg tablet NOT TAKING      potassium chloride (MICRO-K) 10 mEq CR capsule Take 1 capsule by mouth daily.      tiotropium bromide (SPIRIVA RESPIMAT) 2.5 mcg/actuation mist inhaler 1 puff daily as needed.       No current facility-administered medications for this visit.       Past Medical History:   Diagnosis Date    Anxiety     Arthritis     Bowel obstruction (CMS/HCC)     Closed fracture of body of sternum     COPD (chronic obstructive pulmonary disease) (CMS/HCC)     COVID-19 vaccine series completed     Depression     Lung nodules     Osteoporosis      Past Surgical History   Procedure Laterality Date    Cholecystectomy      Colectomy      Colonoscopy      Hysterectomy      ION NAVIGATIONAL BRONCHOSCOPY FOR LUNG BIOPSY,  ENDOBRONCHIAL ULTRASOUND FOR LYMPH NODE BIOPSY N/A 11/15/2023    Performed by Dalton Crump MD PhD at [facility]    Laparotomy exploratory      Lumbar epidural  "injection      Tonsillectomy         Social History     Socioeconomic History    Marital status:    Tobacco Use    Smoking status: Former     Current packs/day: 0.00     Average packs/day: 2.0 packs/day for 30.0 years (60.0 ttl pk-yrs)     Types: Cigarettes     Start date:      Quit date:      Years since quitting: 15.2    Smokeless tobacco: Never   Substance and Sexual Activity    Alcohol use: Yes     Alcohol/week: 5.0 standard drinks of alcohol     Types: 5 Glasses of wine per week     Comment: wine /day - 10 ounces    Drug use: Never     Social Drivers of Health     Food Insecurity: No Food Insecurity (2025)    Hunger Vital Sign     Worried About Running Out of Food in the Last Year: Never true     Ran Out of Food in the Last Year: Never true       Family History  Family History   Problem Relation Name Age of Onset    Hypertension Biological Mother      Brain cancer Biological Father        Family Status   Relation Name Status    Bio Mother      Bio Father     No partnership data on file        BMI: Estimated body mass index is 27.47 kg/m² as calculated from the following:    Height as of 3/25/25: 1.499 m (4' 11\").    Weight as of 3/25/25: 61.7 kg (136 lb).  Performance Status: ECOG 0  Physical Exam   Constitutional: Patient is oriented to person, place, and time and appears well-developed and well-nourished. No distress. She appears younger than her stated age.   HENT: Head: Normocephalic and atraumatic.  Eyes: Conjunctivae and EOM are normal. Neck: Normal range of motion. Neck supple.   Cardiovascular: Normal rate and regular rhythm.    No murmur heard.  Pulmonary/Chest: Effort normal and breath sounds normal. No respiratory distress or wheezes.   Abdominal: Soft. Bowel sounds are normal. There is no tenderness.   Lymphadenopathy:   There is no cervical, supraclavicular, or axillary adenopathy bilaterally   Neurological: Patient appears to have full understanding of current " medical conditions. There is no gross cranial nerve deficit.   Skin: Skin is warm and dry.   Psychiatric: Mood and affect are normal.    Radiology Review  CT PET SKULL BASE TO MID THIGH  Result Date: 3/26/2025  IMPRESSION: Interval increase in hypermetabolic mediastinal and right hilar lymphadenopathy compared to 1/28/2025. In accordance with [place] Act 112,  the patient will receive a letter notifying them to follow up with their physician.    X-RAY SHOULDER LEFT 2+ VIEWS  Result Date: 2/18/2025   Healing fracture of the humeral neck.    CT PET SKULL BASE TO MID THIGH  Result Date: 1/29/2025  IMPRESSION: Interval increase in hypermetabolic mediastinal and right hilar lymphadenopathy 9/17/2024. In accordance with [place] Act 112,  the patient will receive a letter notifying them to follow up with their physician.    MRI HUMERUS RIGHT WITH AND WITHOUT CONTRAST  Result Date: 1/22/2025  IMPRESSION: 1. Proximal right humeral head and neck fracture with abnormal marrow signal and enhancement as discussed below, likely sequela of the acute fracture rather than underlying pathologic lesion. However, follow-up right shoulder radiographs recommended to document appropriate healing and callus formation. 2. Suspected tendinosis and at least partial-thickness tearing of the right rotator cuff and intra-articular portion proximal long head biceps tendon, which can be better evaluated with noncontrast right shoulder MRI. COMMENT: MRI of the right humerus is performed utilizing multiple pulse sequences in multiple planes with pre and postcontrast imaging. 6.1 cc intravenous Gadavist was administered. There is redemonstration of a proximal right humeral neck fracture extending through the right greater tuberosity. There is associated exuberant bone marrow edema and mild surrounding soft tissue/periosteal edema as well as postcontrast enhancement. There are areas of slight T1 marrow replacement about the fracture, likely due to the exuberant  bone marrow edema and overall acute fracture rather than underlying pathologic lesion. However, follow-up right shoulder radiographs recommended to document appropriate healing and callus formation, as small underlying pathologic lesion ultimately cannot be completely excluded (albeit considered much less likely). No joint dislocation. Suggestion of proximal long head biceps tendinosis and possible tearing along the intra-articular portion, as well as suspected right rotator cuff tendinosis and at least partial-thickness tearing, overall suboptimally evaluated due to field-of-view, and can be better evaluated with noncontrast right shoulder MRI. Triceps tendon insertion is intact. Muscle bulk throughout the right humerus is maintained. In accordance with [place] Act 112,  the patient will receive a letter notifying them to follow up with their physician.     X-RAY SHOULDER LEFT 2+ VIEWS  Result Date: 1/14/2025   Osseous demineralization limits the sensitivity for detection of fractures and subtle periosteal reaction. Proximal humerus fracture involving the humeral neck with persistent fracture lucency, no appreciable increased callus. Partially imaged interstitial lung thickening could represent interstitial edema or interstitial lung disease.    X-RAY SHOULDER LEFT 2+ VIEWS  Result Date: 1/8/2025  Proximal humerus fracture involving the humeral neck. Partially imaged pulmonary edema.    X-RAY HUMERUS RIGHT  Result Date: 1/5/2025  IMPRESSION: Proximal right humeral fracture as described.     CT HEAD WITHOUT IV CONTRAST  CT HEAD WITHOUT IV CONTRAST, CT CERVICAL SPINE WITHOUT IV CONTRAST  Result Date: 1/5/2025  IMPRESSION: 1. No acute intracranial abnormality. 2. No acute fracture of the cervical spine. Preliminary report was provided by [facility] service     CT PET SKULL BASE TO MID THIGH  Result Date: 9/17/2024  IMPRESSION: There appear to be bilateral FDG avid subacute rib fractures which correlates with patient's  stated history of recent fall. There are no new or increasingly FDG avid suspicious findings. There is essentially stable right hilar and right supraclavicular/upper paratracheal clemente uptake.     CT HEAD WITHOUT IV CONTRAST  CT HEAD WITHOUT IV CONTRAST, CT ORBITS AND SELLA WITHOUT IV CONTRAST  CT HEAD WITHOUT IV CONTRAST, CT ORBITS AND SELLA WITHOUT IV CONTRAST, CT CERVICAL SPINE WITHOUT IV CONTRAST  Result Date: 8/7/2024  IMPRESSION: 1. No posttraumatic intracranial abnormality. 2. No acute facial bone fractures. 3. No acute cervical spine fractures.  As clinically indicated, MRI of the cervical spine is recommended for further evaluation. 4. Other incidental findings noted under the comment.     CT PET SKULL BASE TO MID THIGH  Result Date: 6/26/2024  IMPRESSION: Single subcentimeter right supraclavicular lymph node showing an interval increase in FDG uptake, indeterminate for metastatic versus reactive adenopathy. Otherwise, there is continued improvement in mediastinal and hilar lymphadenopathy. No new pulmonary nodules identified.       Pathology Review   Pathology Report   Component Value Date    FINALDX  11/15/2023     A.  Left upper lobe, medial, biopsy:     Lung parenchyma with patchy organizing fibrosis.   Negative for carcinoma.    B.  Right lower lobe inferior biopsy:     Lung parenchyma with chronic inflammation and organizing fibrosis.   Small focus of non-necrotizing granulomatous inflammation and focal reactive pneumocyte atypia only seen on   frozen section slide.   Negative for carcinoma.    C.  Right lower lobe, inferior biopsy #2:     Lung parenchyma with chronic inflammation and organizing fibrosis.   Focal non-necrotizing granulomatous inflammation with multi-nucleated giant cells, present only on frozen section   slide.    Negative for carcinoma.    D.  Right lower lobe inferior biopsy #3:     Lung parenchyma with chronic inflammation and focal organizing fibrosis.   Focal non-necrotizing  granulomatous inflammation present only on frozen section slide.   Negative for carcinoma.    E.  Right lower lobe posterior biopsy:     Fragments of lung parenchyma, negative for significant inflammation and carcinoma.    F.  Left upper lobe biopsy:     Fragments of lung parenchyma, negative for significant inflammation and carcinoma.   Deeper levels and immunohistochemical stains for TTF-1 and p40 support the diagnosis.   See immunohistochemical section.    G.  Right lower lobe inferior biopsy:     Fragments of lung parenchyma with chronic inflammation and focal organizing fibrosis.   Negative for carcinoma.      FINALDX  11/15/2023     A. Lung, Left Upper Lobe, FNA pass 1 and 2, FNA Without Cell Block:     Positive for malignancy.  Cells compatible with adenocarcinoma.  Also, please see surgical pathology report [id number].      B. Bronchial Brushing, Left Upper Lobe, Touch Prep, pass 1 and 2, Thinprep and Touch Prep:     Positive for malignancy.  Few atypical epithelial cell groups noted, compatible with carcinoma, non-small cell type.    C. Bronchioalveolar Lavage, Left Upper Lobe, BAL, Thinprep Only:     Chiefly blood.  Rare neutrophils noted.  Specimen insufficient for further evaluation.      D. Bronchioalveolar Lavage, Left Upper Lobe, Medial, BAL, Thinprep Only:     No malignant cells identified.  Scant specimen.  Cell count: 3% macrophages, 1% lymphocytes, 96% neutrophils.  Cell count may be inaccurate due to sparse cellularity.      E. Bronchial Brushing, Right Lower Lobe, Inferior, Touch Prep, pass 1 and 2, Thinprep and Touch Prep:     No malignant cells identified.  Morphologically bland appearing glandular cell groups noted.      F. Lung, Right Lower Lobe, Inferior FNA, pass 1 and 2, FNA Without Cell Block:     No malignant cells identified.  Few morphologically benign appearing glandular cell groups noted.      G. Bronchioalveolar Lavage, Right Lower Lobe, Inferior BAL, Thinprep Only:     Chiefly  blood noted.  Rare inflammatory cells seen.  Specimen insufficient for further evaluation.      H. Lung, Right Lower Lobe, Posterior, FNA pass 1 and 2, FNA With Cell Block:     Non-diagnostic specimen.  Few inflammatory cells and blood noted.  No epithelial cells present for evaluation.    I. Bronchial Brushing, Right Lower Lobe, Posterior, Touch Prep, pass 1 and 2, Thinprep and Touch Prep:     Non-diagnostic specimen.  Rare morphologically bland epithelial cell groups, few macrophages and inflammatory cells noted.      J. Bronchioalveolar Lavage, Right Lower Lobe, Posterior BAL, Thinprep Only:     Chiefly blood with few inflammatory cells noted.  Specimen insufficient for further evaluation.    K. Lymph Node, Right Hilum FNA, FNA With Cell Block:     Positive for malignancy.  Cells compatible with adenocarcinoma .      L. Lymph Node, Level 4R, FNA With Cell Block:     Positive for malignancy.  Cells compatible with adenocarcinoma..  Rare none necrotizing epithelioid granulomas noted.  See note.      M. Lymph Node, Level 7, FNA With Cell Block & Flow:     Positive for malignancy.  Cells compatible with adenocarcinoma.  See note.    N. Lymph Node, Left Hilum, FNA With Cell Block:     Non-necrotizing epithelioid granulomas and rare atypical but degenerated epithelial cells noted.            Assessment   Assessment  I had a long discussion with the patient  in regards to my findings and recommendations.  She is a 89yo woman with metastatic adenocarcinoma of the lung who has had good response on Keytruda alone since Dec 2023 who now has slight progression in hilar and mediastinal lymph nodes on PET/CT.  I independently reviewed the past 2 PET scans.     I recommended a course of regional lymph node radiation to area of PET progression. We went over the risk, benefits, and potential long term and short term side effects of lung radiation therapy which include but are not limited to skin irritation at the treated region,  fatigue, and a low risk of esophageal irradiation, pericarditis, cardiomyopathies, rib fraction, and radiation pneumonitis.      The patient asked many questions that were answered and is interested in proceeding with a course of radiation treatment.     We discussed any cultural concerns, barriers to care, and a shared decision making model was utilized.       Informed consent was obtained  Patient will undergo CT simulation    Systemic treatment:  [facility] Dr. Browning  [facility]  Depression Screening:negative  Pain Assessment: Patient denies any pain or any changes requiring additional pain medication   PCP: Regularly with Natalya Sullivan MD    Follow Up:   When patient starts radiation treatments.     Feel free to call or text my cell phone at [phone], if there are and questions in regards to this patient.       Gail Guy MD  [facility]   of Radiation Oncology    For our patients: [facility] Department of Radiation Oncology strongly recommends that you visit a Primary Care Provider (PCP) regularly. Your PCP can help you implement the recommendations we gave you today, coordinate care among your specialists, as well as make sure you are up to date with wellness exams, immunizations and preventive screenings.  Your PCP can also help when you are feeling sick, potentially avoiding the need for urgent care or emergency department visits.  For these reasons, it is important that you follow up with your PCP at least annually or more often based upon your medical conditions.  If you do not have a PCP, please call [phone] ([phone]) or go to [email or web address]

## 2025-04-02 ENCOUNTER — HOSPITAL ENCOUNTER (OUTPATIENT)
Dept: RADIATION ONCOLOGY | Facility: HOSPITAL | Age: 89
Setting detail: RADIATION/ONCOLOGY SERIES
Discharge: HOME | End: 2025-04-02
Attending: RADIOLOGY
Payer: MEDICARE

## 2025-04-02 ENCOUNTER — HOSPITAL ENCOUNTER (OUTPATIENT)
Dept: RADIATION ONCOLOGY | Facility: HOSPITAL | Age: 89
Setting detail: RADIATION/ONCOLOGY SERIES
Discharge: HOME | End: 2025-04-02
Payer: MEDICARE

## 2025-04-02 ENCOUNTER — LAB REQUISITION (OUTPATIENT)
Dept: LAB | Facility: HOSPITAL | Age: 89
End: 2025-04-02
Attending: INTERNAL MEDICINE
Payer: MEDICARE

## 2025-04-02 VITALS — HEART RATE: 79 BPM | SYSTOLIC BLOOD PRESSURE: 162 MMHG | DIASTOLIC BLOOD PRESSURE: 96 MMHG | OXYGEN SATURATION: 95 %

## 2025-04-02 DIAGNOSIS — D49.1 NEOPLASM OF HILUS OF RIGHT LUNG: Primary | ICD-10-CM

## 2025-04-02 DIAGNOSIS — Z51.11 ENCOUNTER FOR ANTINEOPLASTIC CHEMOTHERAPY: ICD-10-CM

## 2025-04-02 DIAGNOSIS — C34.12 MALIGNANT NEOPLASM OF UPPER LOBE, LEFT BRONCHUS OR LUNG (CMS/HCC): ICD-10-CM

## 2025-04-02 LAB
ALBUMIN SERPL-MCNC: 3.7 G/DL (ref 3.5–5.7)
ALP SERPL-CCNC: 74 IU/L (ref 34–125)
ALT SERPL-CCNC: 23 IU/L (ref 7–52)
ANION GAP SERPL CALC-SCNC: 8 MEQ/L (ref 3–15)
AST SERPL-CCNC: 25 IU/L (ref 13–39)
BASOPHILS # BLD: 0.04 K/UL (ref 0.01–0.1)
BASOPHILS NFR BLD: 0.7 %
BILIRUB SERPL-MCNC: 0.6 MG/DL (ref 0.3–1.2)
BUN SERPL-MCNC: 12 MG/DL (ref 7–25)
CALCIUM SERPL-MCNC: 9 MG/DL (ref 8.6–10.3)
CHLORIDE SERPL-SCNC: 105 MEQ/L (ref 98–107)
CO2 SERPL-SCNC: 26 MEQ/L (ref 21–31)
CORTIS SERPL-MCNC: 15.8 UG/DL
CREAT SERPL-MCNC: 0.6 MG/DL (ref 0.6–1.2)
DIFFERENTIAL METHOD BLD: ABNORMAL
EGFRCR SERPLBLD CKD-EPI 2021: >60 ML/MIN/1.73M*2
EOSINOPHIL # BLD: 0.06 K/UL (ref 0.04–0.36)
EOSINOPHIL NFR BLD: 1.1 %
ERYTHROCYTE [DISTWIDTH] IN BLOOD BY AUTOMATED COUNT: 12.3 % (ref 11.7–14.4)
GLUCOSE SERPL-MCNC: 122 MG/DL (ref 70–99)
HCT VFR BLD AUTO: 42.6 % (ref 35–45)
HGB BLD-MCNC: 14.3 G/DL (ref 11.8–15.7)
IMM GRANULOCYTES # BLD AUTO: 0.03 K/UL (ref 0–0.08)
IMM GRANULOCYTES NFR BLD AUTO: 0.5 %
LYMPHOCYTES # BLD: 0.83 K/UL (ref 1.2–3.5)
LYMPHOCYTES NFR BLD: 15.1 %
MCH RBC QN AUTO: 31.2 PG (ref 28–33.2)
MCHC RBC AUTO-ENTMCNC: 33.6 G/DL (ref 32.2–35.5)
MCV RBC AUTO: 92.8 FL (ref 83–98)
MONOCYTES # BLD: 0.49 K/UL (ref 0.28–0.8)
MONOCYTES NFR BLD: 8.9 %
NEUTROPHILS # BLD: 4.06 K/UL (ref 1.7–7)
NEUTROPHILS # BLD: 4.06 K/UL (ref 1.7–7)
NEUTS SEG NFR BLD: 73.7 %
NRBC BLD-RTO: 0 %
PLATELET # BLD AUTO: 125 K/UL (ref 150–369)
PMV BLD AUTO: 13.5 FL (ref 9.4–12.3)
POTASSIUM SERPL-SCNC: 4.2 MEQ/L (ref 3.5–5.1)
PROT SERPL-MCNC: 5.6 G/DL (ref 6–8.2)
RBC # BLD AUTO: 4.59 M/UL (ref 3.93–5.22)
SODIUM SERPL-SCNC: 139 MEQ/L (ref 136–145)
TSH SERPL DL<=0.05 MIU/L-ACNC: 1.44 MIU/L (ref 0.34–5.6)
WBC # BLD AUTO: 5.51 K/UL (ref 3.8–10.5)

## 2025-04-02 PROCEDURE — 84443 ASSAY THYROID STIM HORMONE: CPT | Performed by: INTERNAL MEDICINE

## 2025-04-02 PROCEDURE — 82533 TOTAL CORTISOL: CPT | Performed by: INTERNAL MEDICINE

## 2025-04-02 PROCEDURE — 77332 RADIATION TREATMENT AID(S): CPT | Performed by: RADIOLOGY

## 2025-04-02 PROCEDURE — 77290 THER RAD SIMULAJ FIELD CPLX: CPT | Performed by: RADIOLOGY

## 2025-04-02 PROCEDURE — 85025 COMPLETE CBC W/AUTO DIFF WBC: CPT | Performed by: INTERNAL MEDICINE

## 2025-04-02 PROCEDURE — 80053 COMPREHEN METABOLIC PANEL: CPT | Performed by: INTERNAL MEDICINE

## 2025-04-02 RX ORDER — DIMETHICONE 13 MG/ML
1 LOTION TOPICAL DAILY
COMMUNITY

## 2025-04-02 RX ORDER — ASCORBIC ACID 250 MG
250 TABLET ORAL DAILY
COMMUNITY

## 2025-04-02 RX ORDER — DEXTROMETHORPHAN HYDROBROMIDE, GUAIFENESIN 5; 100 MG/5ML; MG/5ML
650 LIQUID ORAL EVERY 8 HOURS PRN
COMMUNITY

## 2025-04-02 ASSESSMENT — ENCOUNTER SYMPTOMS
OCCASIONAL FEELINGS OF UNSTEADINESS: 1
LOSS OF SENSATION IN FEET: 0
DEPRESSION: 0

## 2025-04-02 ASSESSMENT — PAIN SCALES - GENERAL: PAINLEVEL_OUTOF10: 0-NO PAIN

## 2025-04-02 NOTE — LETTER
April 2, 2025                                                          Patient: Nicole Goncalves   YOB: 1934   Date of Visit: 4/2/2025       Dear Dr. Browning:    The patient is seen at the [facility] today. Attached is my assessment and plan of care.  Thank you for the opportunity to share in Nicole Goncalves's care.       Sincerely,    MD Gail Gaitan MD      CC: Natalya Sullivan MD

## 2025-04-22 ENCOUNTER — HOSPITAL ENCOUNTER (OUTPATIENT)
Dept: RADIATION ONCOLOGY | Facility: HOSPITAL | Age: 89
Setting detail: RADIATION/ONCOLOGY SERIES
Discharge: HOME | End: 2025-04-22
Attending: RADIOLOGY
Payer: MEDICARE

## 2025-04-22 LAB
ARIA ZRC COURSE ID: NORMAL
ARIA ZRC COURSE START DATE: NORMAL
ARIA ZRC TREATMENT ELAPSED DAYS: NORMAL
ARIA ZRP FRACTIONS TREATED TO DATE: NORMAL
ARIA ZRP PLAN ID: NORMAL
ARIA ZRP PRESCRIBED DOSE CGY: 2250
ARIA ZRP PRESCRIBED DOSE PER FRACTION: 7.5
ARIA ZRR DOSAGE GIVEN TO DATE: 7.5
ARIA ZRR REFERENCE POINT ID: NORMAL
ARIA ZRR SESSION DOSAGE GIVEN: 7.5

## 2025-04-22 PROCEDURE — 77338 DESIGN MLC DEVICE FOR IMRT: CPT | Performed by: RADIOLOGY

## 2025-04-22 PROCEDURE — 77373 STRTCTC BDY RAD THER TX DLVR: CPT | Performed by: RADIOLOGY

## 2025-04-23 ENCOUNTER — LAB REQUISITION (OUTPATIENT)
Dept: LAB | Facility: HOSPITAL | Age: 89
End: 2025-04-23
Attending: INTERNAL MEDICINE
Payer: MEDICARE

## 2025-04-23 DIAGNOSIS — C34.12 MALIGNANT NEOPLASM OF UPPER LOBE, LEFT BRONCHUS OR LUNG (CMS/HCC): ICD-10-CM

## 2025-04-23 LAB
ALBUMIN SERPL-MCNC: 3.6 G/DL (ref 3.5–5.7)
ALP SERPL-CCNC: 77 IU/L (ref 34–125)
ALT SERPL-CCNC: 25 IU/L (ref 7–52)
ANION GAP SERPL CALC-SCNC: 8 MEQ/L (ref 3–15)
AST SERPL-CCNC: 20 IU/L (ref 13–39)
BASOPHILS # BLD: 0.03 K/UL (ref 0.01–0.1)
BASOPHILS NFR BLD: 0.6 %
BILIRUB SERPL-MCNC: 0.6 MG/DL (ref 0.3–1.2)
BUN SERPL-MCNC: 12 MG/DL (ref 7–25)
CALCIUM SERPL-MCNC: 9.3 MG/DL (ref 8.6–10.3)
CHLORIDE SERPL-SCNC: 108 MEQ/L (ref 98–107)
CO2 SERPL-SCNC: 24 MEQ/L (ref 21–31)
CORTIS SERPL-MCNC: 9.3 UG/DL
CREAT SERPL-MCNC: 0.6 MG/DL (ref 0.6–1.2)
DIFFERENTIAL METHOD BLD: ABNORMAL
EGFRCR SERPLBLD CKD-EPI 2021: >60 ML/MIN/1.73M*2
EOSINOPHIL # BLD: 0.06 K/UL (ref 0.04–0.36)
EOSINOPHIL NFR BLD: 1.2 %
ERYTHROCYTE [DISTWIDTH] IN BLOOD BY AUTOMATED COUNT: 12.6 % (ref 11.7–14.4)
GLUCOSE SERPL-MCNC: 116 MG/DL (ref 70–99)
HCT VFR BLD AUTO: 44.6 % (ref 35–45)
HGB BLD-MCNC: 14.8 G/DL (ref 11.8–15.7)
IMM GRANULOCYTES # BLD AUTO: 0.01 K/UL (ref 0–0.08)
IMM GRANULOCYTES NFR BLD AUTO: 0.2 %
LYMPHOCYTES # BLD: 0.62 K/UL (ref 1.2–3.5)
LYMPHOCYTES NFR BLD: 12.1 %
MCH RBC QN AUTO: 31.4 PG (ref 28–33.2)
MCHC RBC AUTO-ENTMCNC: 33.2 G/DL (ref 32.2–35.5)
MCV RBC AUTO: 94.5 FL (ref 83–98)
MONOCYTES # BLD: 0.43 K/UL (ref 0.28–0.8)
MONOCYTES NFR BLD: 8.4 %
NEUTROPHILS # BLD: 3.97 K/UL (ref 1.7–7)
NEUTROPHILS # BLD: 3.97 K/UL (ref 1.7–7)
NEUTS SEG NFR BLD: 77.5 %
NRBC BLD-RTO: 0 %
PLATELET # BLD AUTO: 121 K/UL (ref 150–369)
PMV BLD AUTO: 14 FL (ref 9.4–12.3)
POTASSIUM SERPL-SCNC: 4.4 MEQ/L (ref 3.5–5.1)
PROT SERPL-MCNC: 5.4 G/DL (ref 6–8.2)
RBC # BLD AUTO: 4.72 M/UL (ref 3.93–5.22)
SODIUM SERPL-SCNC: 140 MEQ/L (ref 136–145)
TSH SERPL DL<=0.05 MIU/L-ACNC: 1.32 MIU/L (ref 0.34–5.6)
WBC # BLD AUTO: 5.12 K/UL (ref 3.8–10.5)

## 2025-04-23 PROCEDURE — 85025 COMPLETE CBC W/AUTO DIFF WBC: CPT | Performed by: INTERNAL MEDICINE

## 2025-04-23 PROCEDURE — 80053 COMPREHEN METABOLIC PANEL: CPT | Performed by: INTERNAL MEDICINE

## 2025-04-23 PROCEDURE — 82533 TOTAL CORTISOL: CPT | Performed by: INTERNAL MEDICINE

## 2025-04-23 PROCEDURE — 84443 ASSAY THYROID STIM HORMONE: CPT | Mod: GZ | Performed by: INTERNAL MEDICINE

## 2025-04-24 ENCOUNTER — HOSPITAL ENCOUNTER (OUTPATIENT)
Dept: RADIATION ONCOLOGY | Facility: HOSPITAL | Age: 89
Setting detail: RADIATION/ONCOLOGY SERIES
Discharge: HOME | End: 2025-04-24
Attending: RADIOLOGY
Payer: MEDICARE

## 2025-04-24 LAB
ARIA ZRC COURSE ID: NORMAL
ARIA ZRC COURSE START DATE: NORMAL
ARIA ZRC TREATMENT ELAPSED DAYS: NORMAL
ARIA ZRP FRACTIONS TREATED TO DATE: NORMAL
ARIA ZRP PLAN ID: NORMAL
ARIA ZRP PRESCRIBED DOSE CGY: 2250
ARIA ZRP PRESCRIBED DOSE PER FRACTION: 7.5
ARIA ZRR DOSAGE GIVEN TO DATE: 15
ARIA ZRR REFERENCE POINT ID: NORMAL
ARIA ZRR SESSION DOSAGE GIVEN: 7.5

## 2025-04-24 PROCEDURE — 77373 STRTCTC BDY RAD THER TX DLVR: CPT | Performed by: RADIOLOGY

## 2025-04-30 ENCOUNTER — HOSPITAL ENCOUNTER (OUTPATIENT)
Dept: RADIATION ONCOLOGY | Facility: HOSPITAL | Age: 89
Setting detail: RADIATION/ONCOLOGY SERIES
Discharge: HOME | End: 2025-04-30
Attending: RADIOLOGY
Payer: MEDICARE

## 2025-04-30 DIAGNOSIS — D49.1 NEOPLASM OF HILUS OF RIGHT LUNG: Primary | ICD-10-CM

## 2025-04-30 LAB
ARIA ZRC COURSE ID: NORMAL
ARIA ZRC COURSE ID: NORMAL
ARIA ZRC COURSE START DATE: NORMAL
ARIA ZRC COURSE START DATE: NORMAL
ARIA ZRC TREATMENT ELAPSED DAYS: NORMAL
ARIA ZRC TREATMENT ELAPSED DAYS: NORMAL
ARIA ZRP FRACTIONS TREATED TO DATE: NORMAL
ARIA ZRP FRACTIONS TREATED TO DATE: NORMAL
ARIA ZRP PLAN ID: NORMAL
ARIA ZRP PLAN ID: NORMAL
ARIA ZRP PLAN NAME: NORMAL
ARIA ZRP PRESCRIBED DOSE CGY: 2250
ARIA ZRP PRESCRIBED DOSE CGY: 2250
ARIA ZRP PRESCRIBED DOSE PER FRACTION: 7.5
ARIA ZRP PRESCRIBED DOSE PER FRACTION: 7.5
ARIA ZRR DOSAGE GIVEN TO DATE: 22.5
ARIA ZRR DOSAGE GIVEN TO DATE: 22.5
ARIA ZRR REFERENCE POINT ID: NORMAL
ARIA ZRR REFERENCE POINT ID: NORMAL
ARIA ZRR SESSION DOSAGE GIVEN: 7.5

## 2025-04-30 PROCEDURE — 77373 STRTCTC BDY RAD THER TX DLVR: CPT | Performed by: RADIOLOGY

## 2025-05-14 ENCOUNTER — LAB REQUISITION (OUTPATIENT)
Dept: LAB | Facility: HOSPITAL | Age: 89
End: 2025-05-14
Attending: INTERNAL MEDICINE
Payer: MEDICARE

## 2025-05-14 DIAGNOSIS — C34.12 MALIGNANT NEOPLASM OF UPPER LOBE, LEFT BRONCHUS OR LUNG (CMS/HCC): ICD-10-CM

## 2025-05-14 LAB
ALBUMIN SERPL-MCNC: 3.8 G/DL (ref 3.5–5.7)
ALP SERPL-CCNC: 63 IU/L (ref 34–125)
ALT SERPL-CCNC: 24 IU/L (ref 7–52)
ANION GAP SERPL CALC-SCNC: 9 MEQ/L (ref 3–15)
AST SERPL-CCNC: 20 IU/L (ref 13–39)
BASOPHILS # BLD: 0.03 K/UL (ref 0.01–0.1)
BASOPHILS NFR BLD: 0.5 %
BILIRUB SERPL-MCNC: 0.7 MG/DL (ref 0.3–1.2)
BUN SERPL-MCNC: 15 MG/DL (ref 7–25)
CALCIUM SERPL-MCNC: 9.6 MG/DL (ref 8.6–10.3)
CHLORIDE SERPL-SCNC: 107 MEQ/L (ref 98–107)
CO2 SERPL-SCNC: 24 MEQ/L (ref 21–31)
CORTIS SERPL-MCNC: 9.8 UG/DL
CREAT SERPL-MCNC: 0.7 MG/DL (ref 0.6–1.2)
DIFFERENTIAL METHOD BLD: ABNORMAL
EGFRCR SERPLBLD CKD-EPI 2021: >60 ML/MIN/1.73M*2
EOSINOPHIL # BLD: 0.06 K/UL (ref 0.04–0.36)
EOSINOPHIL NFR BLD: 1.1 %
ERYTHROCYTE [DISTWIDTH] IN BLOOD BY AUTOMATED COUNT: 12.9 % (ref 11.7–14.4)
GLUCOSE SERPL-MCNC: 113 MG/DL (ref 70–99)
HCT VFR BLD AUTO: 43.7 % (ref 35–45)
HGB BLD-MCNC: 14.7 G/DL (ref 11.8–15.7)
IMM GRANULOCYTES # BLD AUTO: 0.02 K/UL (ref 0–0.08)
IMM GRANULOCYTES NFR BLD AUTO: 0.4 %
LYMPHOCYTES # BLD: 0.54 K/UL (ref 1.2–3.5)
LYMPHOCYTES NFR BLD: 9.9 %
MCH RBC QN AUTO: 31.9 PG (ref 28–33.2)
MCHC RBC AUTO-ENTMCNC: 33.6 G/DL (ref 32.2–35.5)
MCV RBC AUTO: 94.8 FL (ref 83–98)
MONOCYTES # BLD: 0.53 K/UL (ref 0.28–0.8)
MONOCYTES NFR BLD: 9.7 %
NEUTROPHILS # BLD: 4.3 K/UL (ref 1.7–7)
NEUTROPHILS # BLD: 4.3 K/UL (ref 1.7–7)
NEUTS SEG NFR BLD: 78.4 %
NRBC BLD-RTO: 0 %
PLATELET # BLD AUTO: 129 K/UL (ref 150–369)
PMV BLD AUTO: 13.3 FL (ref 9.4–12.3)
POTASSIUM SERPL-SCNC: 4.4 MEQ/L (ref 3.5–5.1)
PROT SERPL-MCNC: 5.9 G/DL (ref 6–8.2)
RBC # BLD AUTO: 4.61 M/UL (ref 3.93–5.22)
SODIUM SERPL-SCNC: 140 MEQ/L (ref 136–145)
TSH SERPL DL<=0.05 MIU/L-ACNC: 1.07 MIU/L (ref 0.34–5.6)
WBC # BLD AUTO: 5.48 K/UL (ref 3.8–10.5)

## 2025-05-14 PROCEDURE — 85025 COMPLETE CBC W/AUTO DIFF WBC: CPT | Performed by: INTERNAL MEDICINE

## 2025-05-14 PROCEDURE — 84443 ASSAY THYROID STIM HORMONE: CPT | Mod: GZ | Performed by: INTERNAL MEDICINE

## 2025-05-14 PROCEDURE — 80053 COMPREHEN METABOLIC PANEL: CPT | Performed by: INTERNAL MEDICINE

## 2025-05-14 PROCEDURE — 82533 TOTAL CORTISOL: CPT | Performed by: INTERNAL MEDICINE

## 2025-06-04 ENCOUNTER — LAB REQUISITION (OUTPATIENT)
Dept: LAB | Facility: HOSPITAL | Age: 89
End: 2025-06-04
Attending: INTERNAL MEDICINE
Payer: MEDICARE

## 2025-06-04 DIAGNOSIS — C34.12 MALIGNANT NEOPLASM OF UPPER LOBE, LEFT BRONCHUS OR LUNG (CMS/HCC): ICD-10-CM

## 2025-06-04 LAB
ALBUMIN SERPL-MCNC: 3.8 G/DL (ref 3.5–5.7)
ALP SERPL-CCNC: 70 IU/L (ref 34–125)
ALT SERPL-CCNC: 20 IU/L (ref 7–52)
ANION GAP SERPL CALC-SCNC: 8 MEQ/L (ref 3–15)
AST SERPL-CCNC: 17 IU/L (ref 13–39)
BASOPHILS # BLD: 0.03 K/UL (ref 0.01–0.1)
BASOPHILS NFR BLD: 0.7 %
BILIRUB SERPL-MCNC: 0.7 MG/DL (ref 0.3–1.2)
BUN SERPL-MCNC: 20 MG/DL (ref 7–25)
CALCIUM SERPL-MCNC: 9.4 MG/DL (ref 8.6–10.3)
CHLORIDE SERPL-SCNC: 106 MEQ/L (ref 98–107)
CO2 SERPL-SCNC: 25 MEQ/L (ref 21–31)
CORTIS SERPL-MCNC: 14.6 UG/DL
CREAT SERPL-MCNC: 0.6 MG/DL (ref 0.6–1.2)
DIFFERENTIAL METHOD BLD: ABNORMAL
EGFRCR SERPLBLD CKD-EPI 2021: >60 ML/MIN/1.73M*2
EOSINOPHIL # BLD: 0.08 K/UL (ref 0.04–0.36)
EOSINOPHIL NFR BLD: 1.7 %
ERYTHROCYTE [DISTWIDTH] IN BLOOD BY AUTOMATED COUNT: 12.8 % (ref 11.7–14.4)
GLUCOSE SERPL-MCNC: 109 MG/DL (ref 70–99)
HCT VFR BLD AUTO: 41.1 % (ref 35–45)
HGB BLD-MCNC: 13.9 G/DL (ref 11.8–15.7)
IMM GRANULOCYTES # BLD AUTO: 0.02 K/UL (ref 0–0.08)
IMM GRANULOCYTES NFR BLD AUTO: 0.4 %
LYMPHOCYTES # BLD: 0.53 K/UL (ref 1.2–3.5)
LYMPHOCYTES NFR BLD: 11.6 %
MCH RBC QN AUTO: 31.9 PG (ref 28–33.2)
MCHC RBC AUTO-ENTMCNC: 33.8 G/DL (ref 32.2–35.5)
MCV RBC AUTO: 94.3 FL (ref 83–98)
MONOCYTES # BLD: 0.45 K/UL (ref 0.28–0.8)
MONOCYTES NFR BLD: 9.8 %
NEUTROPHILS # BLD: 3.47 K/UL (ref 1.7–7)
NEUTROPHILS # BLD: 3.47 K/UL (ref 1.7–7)
NEUTS SEG NFR BLD: 75.8 %
NRBC BLD-RTO: 0 %
PLATELET # BLD AUTO: 134 K/UL (ref 150–369)
PMV BLD AUTO: 12.7 FL (ref 9.4–12.3)
POTASSIUM SERPL-SCNC: 3.9 MEQ/L (ref 3.5–5.1)
PROT SERPL-MCNC: 5.6 G/DL (ref 6–8.2)
RBC # BLD AUTO: 4.36 M/UL (ref 3.93–5.22)
SODIUM SERPL-SCNC: 139 MEQ/L (ref 136–145)
TSH SERPL DL<=0.05 MIU/L-ACNC: 1.31 MIU/L (ref 0.34–5.6)
WBC # BLD AUTO: 4.58 K/UL (ref 3.8–10.5)

## 2025-06-04 PROCEDURE — 84443 ASSAY THYROID STIM HORMONE: CPT | Performed by: INTERNAL MEDICINE

## 2025-06-04 PROCEDURE — 82040 ASSAY OF SERUM ALBUMIN: CPT | Performed by: INTERNAL MEDICINE

## 2025-06-04 PROCEDURE — 82533 TOTAL CORTISOL: CPT | Performed by: INTERNAL MEDICINE

## 2025-06-04 PROCEDURE — 85025 COMPLETE CBC W/AUTO DIFF WBC: CPT | Performed by: INTERNAL MEDICINE

## 2025-06-25 ENCOUNTER — LAB REQUISITION (OUTPATIENT)
Dept: LAB | Facility: HOSPITAL | Age: 89
End: 2025-06-25
Attending: INTERNAL MEDICINE
Payer: MEDICARE

## 2025-06-25 DIAGNOSIS — Z51.12 ENCOUNTER FOR ANTINEOPLASTIC IMMUNOTHERAPY: ICD-10-CM

## 2025-06-25 DIAGNOSIS — C34.12 MALIGNANT NEOPLASM OF UPPER LOBE, LEFT BRONCHUS OR LUNG (CMS/HCC): ICD-10-CM

## 2025-06-25 LAB
ALBUMIN SERPL-MCNC: 4.4 G/DL (ref 3.5–5.7)
ALP SERPL-CCNC: 84 IU/L (ref 34–125)
ALT SERPL-CCNC: 23 IU/L (ref 7–52)
ANION GAP SERPL CALC-SCNC: 12 MEQ/L (ref 3–15)
AST SERPL-CCNC: 20 IU/L (ref 13–39)
BASOPHILS # BLD: 0.05 K/UL (ref 0.01–0.1)
BASOPHILS NFR BLD: 0.7 %
BILIRUB SERPL-MCNC: 0.8 MG/DL (ref 0.3–1.2)
BUN SERPL-MCNC: 19 MG/DL (ref 7–25)
CALCIUM SERPL-MCNC: 9.9 MG/DL (ref 8.6–10.3)
CHLORIDE SERPL-SCNC: 103 MEQ/L (ref 98–107)
CO2 SERPL-SCNC: 25 MEQ/L (ref 21–31)
CORTIS SERPL-MCNC: 11.9 UG/DL
CREAT SERPL-MCNC: 0.6 MG/DL (ref 0.6–1.2)
DIFFERENTIAL METHOD BLD: ABNORMAL
EGFRCR SERPLBLD CKD-EPI 2021: >60 ML/MIN/1.73M*2
EOSINOPHIL # BLD: 0.08 K/UL (ref 0.04–0.36)
EOSINOPHIL NFR BLD: 1.1 %
ERYTHROCYTE [DISTWIDTH] IN BLOOD BY AUTOMATED COUNT: 12.9 % (ref 11.7–14.4)
GLUCOSE SERPL-MCNC: 100 MG/DL (ref 70–99)
HCT VFR BLD AUTO: 47.5 % (ref 35–45)
HGB BLD-MCNC: 15.8 G/DL (ref 11.8–15.7)
IMM GRANULOCYTES # BLD AUTO: 0.03 K/UL (ref 0–0.08)
IMM GRANULOCYTES NFR BLD AUTO: 0.4 %
LYMPHOCYTES # BLD: 0.91 K/UL (ref 1.2–3.5)
LYMPHOCYTES NFR BLD: 12.1 %
MCH RBC QN AUTO: 31.9 PG (ref 28–33.2)
MCHC RBC AUTO-ENTMCNC: 33.3 G/DL (ref 32.2–35.5)
MCV RBC AUTO: 95.8 FL (ref 83–98)
MONOCYTES # BLD: 0.79 K/UL (ref 0.28–0.8)
MONOCYTES NFR BLD: 10.5 %
NEUTROPHILS # BLD: 5.68 K/UL (ref 1.7–7)
NEUTROPHILS # BLD: 5.68 K/UL (ref 1.7–7)
NEUTS SEG NFR BLD: 75.2 %
NRBC BLD-RTO: 0 %
PLATELET # BLD AUTO: 159 K/UL (ref 150–369)
PMV BLD AUTO: 13.3 FL (ref 9.4–12.3)
POTASSIUM SERPL-SCNC: 3.9 MEQ/L (ref 3.5–5.1)
PROT SERPL-MCNC: 6.6 G/DL (ref 6–8.2)
RBC # BLD AUTO: 4.96 M/UL (ref 3.93–5.22)
SODIUM SERPL-SCNC: 140 MEQ/L (ref 136–145)
TSH SERPL DL<=0.05 MIU/L-ACNC: 1.61 MIU/L (ref 0.34–5.6)
WBC # BLD AUTO: 7.54 K/UL (ref 3.8–10.5)

## 2025-06-25 PROCEDURE — 82533 TOTAL CORTISOL: CPT | Performed by: INTERNAL MEDICINE

## 2025-06-25 PROCEDURE — 85025 COMPLETE CBC W/AUTO DIFF WBC: CPT | Performed by: INTERNAL MEDICINE

## 2025-06-25 PROCEDURE — 84443 ASSAY THYROID STIM HORMONE: CPT | Performed by: INTERNAL MEDICINE

## 2025-06-25 PROCEDURE — 80053 COMPREHEN METABOLIC PANEL: CPT | Performed by: INTERNAL MEDICINE

## 2025-07-16 ENCOUNTER — LAB REQUISITION (OUTPATIENT)
Dept: LAB | Facility: HOSPITAL | Age: 89
End: 2025-07-16
Attending: INTERNAL MEDICINE
Payer: MEDICARE

## 2025-07-16 DIAGNOSIS — C34.12 MALIGNANT NEOPLASM OF UPPER LOBE, LEFT BRONCHUS OR LUNG (CMS/HCC): ICD-10-CM

## 2025-07-16 DIAGNOSIS — R94.6 ABNORMAL RESULTS OF THYROID FUNCTION STUDIES: ICD-10-CM

## 2025-07-16 DIAGNOSIS — Z51.12 ENCOUNTER FOR ANTINEOPLASTIC IMMUNOTHERAPY: ICD-10-CM

## 2025-07-16 LAB
ALBUMIN SERPL-MCNC: 4 G/DL (ref 3.5–5.7)
ALP SERPL-CCNC: 70 IU/L (ref 34–125)
ALT SERPL-CCNC: 24 IU/L (ref 7–52)
ANION GAP SERPL CALC-SCNC: 7 MEQ/L (ref 3–15)
AST SERPL-CCNC: 18 IU/L (ref 13–39)
BASOPHILS # BLD: 0.04 K/UL (ref 0.01–0.1)
BASOPHILS NFR BLD: 0.9 %
BILIRUB SERPL-MCNC: 0.7 MG/DL (ref 0.3–1.2)
BUN SERPL-MCNC: 16 MG/DL (ref 7–25)
CALCIUM SERPL-MCNC: 9.7 MG/DL (ref 8.6–10.3)
CHLORIDE SERPL-SCNC: 105 MEQ/L (ref 98–107)
CO2 SERPL-SCNC: 28 MEQ/L (ref 21–31)
CORTIS SERPL-MCNC: 10 UG/DL
CREAT SERPL-MCNC: 0.5 MG/DL (ref 0.6–1.2)
DIFFERENTIAL METHOD BLD: ABNORMAL
EGFRCR SERPLBLD CKD-EPI 2021: >60 ML/MIN/1.73M*2
EOSINOPHIL # BLD: 0.07 K/UL (ref 0.04–0.36)
EOSINOPHIL NFR BLD: 1.5 %
ERYTHROCYTE [DISTWIDTH] IN BLOOD BY AUTOMATED COUNT: 12.8 % (ref 11.7–14.4)
GLUCOSE SERPL-MCNC: 79 MG/DL (ref 70–99)
HCT VFR BLD AUTO: 43.6 % (ref 35–45)
HGB BLD-MCNC: 14.9 G/DL (ref 11.8–15.7)
IMM GRANULOCYTES # BLD AUTO: 0.02 K/UL (ref 0–0.08)
IMM GRANULOCYTES NFR BLD AUTO: 0.4 %
LYMPHOCYTES # BLD: 0.51 K/UL (ref 1.2–3.5)
LYMPHOCYTES NFR BLD: 10.9 %
MCH RBC QN AUTO: 32.5 PG (ref 28–33.2)
MCHC RBC AUTO-ENTMCNC: 34.2 G/DL (ref 32.2–35.5)
MCV RBC AUTO: 95 FL (ref 83–98)
MONOCYTES # BLD: 0.5 K/UL (ref 0.28–0.8)
MONOCYTES NFR BLD: 10.7 %
NEUTROPHILS # BLD: 3.55 K/UL (ref 1.7–7)
NEUTROPHILS # BLD: 3.55 K/UL (ref 1.7–7)
NEUTS SEG NFR BLD: 75.6 %
NRBC BLD-RTO: 0 %
PLATELET # BLD AUTO: 126 K/UL (ref 150–369)
PMV BLD AUTO: 13.8 FL (ref 9.4–12.3)
POTASSIUM SERPL-SCNC: 4.1 MEQ/L (ref 3.5–5.1)
PROT SERPL-MCNC: 5.7 G/DL (ref 6–8.2)
RBC # BLD AUTO: 4.59 M/UL (ref 3.93–5.22)
SODIUM SERPL-SCNC: 140 MEQ/L (ref 136–145)
TSH SERPL DL<=0.05 MIU/L-ACNC: 1.42 MIU/L (ref 0.34–5.6)
WBC # BLD AUTO: 4.69 K/UL (ref 3.8–10.5)

## 2025-07-16 PROCEDURE — 82533 TOTAL CORTISOL: CPT | Performed by: INTERNAL MEDICINE

## 2025-07-16 PROCEDURE — 84443 ASSAY THYROID STIM HORMONE: CPT | Performed by: INTERNAL MEDICINE

## 2025-07-16 PROCEDURE — 85025 COMPLETE CBC W/AUTO DIFF WBC: CPT | Performed by: INTERNAL MEDICINE

## 2025-07-16 PROCEDURE — 80053 COMPREHEN METABOLIC PANEL: CPT | Performed by: INTERNAL MEDICINE

## 2025-07-29 ENCOUNTER — HOSPITAL ENCOUNTER (OUTPATIENT)
Dept: RADIOLOGY | Facility: CLINIC | Age: 89
Discharge: HOME | End: 2025-07-29
Attending: INTERNAL MEDICINE
Payer: MEDICARE

## 2025-07-29 VITALS — HEIGHT: 59 IN | WEIGHT: 134 LBS | BODY MASS INDEX: 27.01 KG/M2

## 2025-07-29 DIAGNOSIS — C34.12 PRIMARY MALIGNANT NEOPLASM OF BRONCHUS OF LEFT UPPER LOBE (CMS/HCC): ICD-10-CM

## 2025-07-29 RX ORDER — FLUDEOXYGLUCOSE F18 300 MCI/ML
9.04 INJECTION INTRAVENOUS ONCE
Status: COMPLETED | OUTPATIENT
Start: 2025-07-29 | End: 2025-07-29

## 2025-07-29 RX ADMIN — FLUDEOXYGLUCOSE F18 9.04 MILLICURIE: 300 INJECTION INTRAVENOUS at 10:21

## 2025-08-06 ENCOUNTER — LAB REQUISITION (OUTPATIENT)
Dept: LAB | Facility: HOSPITAL | Age: 89
End: 2025-08-06
Attending: INTERNAL MEDICINE
Payer: MEDICARE

## 2025-08-06 DIAGNOSIS — Z51.11 ENCOUNTER FOR ANTINEOPLASTIC CHEMOTHERAPY: ICD-10-CM

## 2025-08-06 DIAGNOSIS — C77.1 SECONDARY AND UNSPECIFIED MALIGNANT NEOPLASM OF INTRATHORACIC LYMPH NODES (CMS/HCC): ICD-10-CM

## 2025-08-06 DIAGNOSIS — C34.12 MALIGNANT NEOPLASM OF UPPER LOBE, LEFT BRONCHUS OR LUNG (CMS/HCC): ICD-10-CM

## 2025-08-06 DIAGNOSIS — D69.6 THROMBOCYTOPENIA, UNSPECIFIED (CMS/HCC): ICD-10-CM

## 2025-08-06 LAB
ALBUMIN SERPL-MCNC: 3.6 G/DL (ref 3.5–5.7)
ALP SERPL-CCNC: 68 IU/L (ref 34–125)
ALT SERPL-CCNC: 23 IU/L (ref 7–52)
ANION GAP SERPL CALC-SCNC: 7 MEQ/L (ref 3–15)
AST SERPL-CCNC: 17 IU/L (ref 13–39)
BASOPHILS # BLD: 0.03 K/UL (ref 0.01–0.1)
BASOPHILS NFR BLD: 0.7 %
BILIRUB SERPL-MCNC: 0.6 MG/DL (ref 0.3–1.2)
BUN SERPL-MCNC: 17 MG/DL (ref 7–25)
CALCIUM SERPL-MCNC: 9.2 MG/DL (ref 8.6–10.3)
CHLORIDE SERPL-SCNC: 107 MEQ/L (ref 98–107)
CO2 SERPL-SCNC: 25 MEQ/L (ref 21–31)
CORTIS SERPL-MCNC: 10.7 UG/DL
CREAT SERPL-MCNC: 0.6 MG/DL (ref 0.6–1.2)
DIFFERENTIAL METHOD BLD: ABNORMAL
EGFRCR SERPLBLD CKD-EPI 2021: >60 ML/MIN/1.73M*2
EOSINOPHIL # BLD: 0.08 K/UL (ref 0.04–0.36)
EOSINOPHIL NFR BLD: 1.8 %
ERYTHROCYTE [DISTWIDTH] IN BLOOD BY AUTOMATED COUNT: 12.7 % (ref 11.7–14.4)
GLUCOSE SERPL-MCNC: 118 MG/DL (ref 70–99)
HCT VFR BLD AUTO: 40.7 % (ref 35–45)
HGB BLD-MCNC: 13.9 G/DL (ref 11.8–15.7)
IMM GRANULOCYTES # BLD AUTO: 0.02 K/UL (ref 0–0.08)
IMM GRANULOCYTES NFR BLD AUTO: 0.4 %
LYMPHOCYTES # BLD: 0.49 K/UL (ref 1.2–3.5)
LYMPHOCYTES NFR BLD: 10.9 %
MCH RBC QN AUTO: 32.6 PG (ref 28–33.2)
MCHC RBC AUTO-ENTMCNC: 34.2 G/DL (ref 32.2–35.5)
MCV RBC AUTO: 95.5 FL (ref 83–98)
MONOCYTES # BLD: 0.37 K/UL (ref 0.28–0.8)
MONOCYTES NFR BLD: 8.2 %
NEUTROPHILS # BLD: 3.5 K/UL (ref 1.7–7)
NEUTROPHILS # BLD: 3.5 K/UL (ref 1.7–7)
NEUTS SEG NFR BLD: 78 %
NRBC BLD-RTO: 0 %
PLATELET # BLD AUTO: 118 K/UL (ref 150–369)
PMV BLD AUTO: 13 FL (ref 9.4–12.3)
POTASSIUM SERPL-SCNC: 4.1 MEQ/L (ref 3.5–5.1)
PROT SERPL-MCNC: 5.3 G/DL (ref 6–8.2)
RBC # BLD AUTO: 4.26 M/UL (ref 3.93–5.22)
SODIUM SERPL-SCNC: 139 MEQ/L (ref 136–145)
TSH SERPL DL<=0.05 MIU/L-ACNC: 1.27 MIU/L (ref 0.34–5.6)
WBC # BLD AUTO: 4.49 K/UL (ref 3.8–10.5)

## 2025-08-06 PROCEDURE — 84443 ASSAY THYROID STIM HORMONE: CPT | Performed by: INTERNAL MEDICINE

## 2025-08-06 PROCEDURE — 82533 TOTAL CORTISOL: CPT | Performed by: INTERNAL MEDICINE

## 2025-08-06 PROCEDURE — 85025 COMPLETE CBC W/AUTO DIFF WBC: CPT | Performed by: INTERNAL MEDICINE

## 2025-08-06 PROCEDURE — 80053 COMPREHEN METABOLIC PANEL: CPT | Performed by: INTERNAL MEDICINE

## 2025-08-27 ENCOUNTER — LAB REQUISITION (OUTPATIENT)
Dept: LAB | Facility: HOSPITAL | Age: 89
End: 2025-08-27
Attending: INTERNAL MEDICINE
Payer: MEDICARE

## (undated) DEVICE — SWIVEL CONNECTOR ION

## (undated) DEVICE — SOLN IRRIG STER WATER 1000ML

## (undated) DEVICE — BRUSH CYTOLOGY BRONCHOSCPY 3MM

## (undated) DEVICE — CRYOPROBE, SINGLE USE 1.1 MM

## (undated) DEVICE — SYRINGE 10CC NO NEEDLE ST

## (undated) DEVICE — SYRINGE DISP LUER-LOK 20 CC

## (undated) DEVICE — VISION PROBE AND SUCTION ADAPTER ION

## (undated) DEVICE — TUBE SUCTION 1/4INX20FT STERILE

## (undated) DEVICE — NEEDLE BIOPSY 21G ION

## (undated) DEVICE — FORCEP DISP BRONCHOSCOPE 2MM NON-SPIIKED

## (undated) DEVICE — SYRINGE DISP LUER-LOK 30 CC

## (undated) DEVICE — SOLN IRRIG .9%SOD 1000ML